# Patient Record
Sex: FEMALE | Race: WHITE | NOT HISPANIC OR LATINO | Employment: FULL TIME | URBAN - METROPOLITAN AREA
[De-identification: names, ages, dates, MRNs, and addresses within clinical notes are randomized per-mention and may not be internally consistent; named-entity substitution may affect disease eponyms.]

---

## 2017-08-30 ENCOUNTER — APPOINTMENT (OUTPATIENT)
Dept: GENERAL RADIOLOGY | Facility: HOSPITAL | Age: 32
End: 2017-08-30

## 2017-08-30 ENCOUNTER — HOSPITAL ENCOUNTER (INPATIENT)
Facility: HOSPITAL | Age: 32
LOS: 3 days | Discharge: HOME | End: 2017-09-02
Attending: EMERGENCY MEDICINE | Admitting: HOSPITALIST

## 2017-08-30 DIAGNOSIS — R26.89 IMPAIRED GAIT AND MOBILITY: ICD-10-CM

## 2017-08-30 DIAGNOSIS — M54.9 INTRACTABLE BACK PAIN: ICD-10-CM

## 2017-08-30 DIAGNOSIS — M54.42 ACUTE MIDLINE LOW BACK PAIN WITH LEFT-SIDED SCIATICA: Primary | ICD-10-CM

## 2017-08-30 DIAGNOSIS — R52 PAIN: ICD-10-CM

## 2017-08-30 LAB
ALBUMIN SERPL-MCNC: 4.9 G/DL (ref 3.5–5.2)
ALBUMIN/GLOB SERPL: 1.7 G/DL
ALP SERPL-CCNC: 57 U/L (ref 39–117)
ALT SERPL W P-5'-P-CCNC: 20 U/L (ref 1–33)
ANION GAP SERPL CALCULATED.3IONS-SCNC: 13 MMOL/L
AST SERPL-CCNC: 20 U/L (ref 1–32)
BASOPHILS # BLD AUTO: 0.02 10*3/MM3 (ref 0–0.2)
BASOPHILS NFR BLD AUTO: 0.4 % (ref 0–1.5)
BILIRUB SERPL-MCNC: 0.3 MG/DL (ref 0.1–1.2)
BUN BLD-MCNC: 8 MG/DL (ref 6–20)
BUN/CREAT SERPL: 10.4 (ref 7–25)
CALCIUM SPEC-SCNC: 9.7 MG/DL (ref 8.6–10.5)
CHLORIDE SERPL-SCNC: 104 MMOL/L (ref 98–107)
CO2 SERPL-SCNC: 25 MMOL/L (ref 22–29)
CREAT BLD-MCNC: 0.77 MG/DL (ref 0.57–1)
DEPRECATED RDW RBC AUTO: 38.5 FL (ref 37–54)
EOSINOPHIL # BLD AUTO: 0.06 10*3/MM3 (ref 0–0.7)
EOSINOPHIL NFR BLD AUTO: 1.3 % (ref 0.3–6.2)
ERYTHROCYTE [DISTWIDTH] IN BLOOD BY AUTOMATED COUNT: 12.8 % (ref 11.7–13)
GFR SERPL CREATININE-BSD FRML MDRD: 86 ML/MIN/1.73
GLOBULIN UR ELPH-MCNC: 2.9 GM/DL
GLUCOSE BLD-MCNC: 89 MG/DL (ref 65–99)
HCT VFR BLD AUTO: 38.3 % (ref 35.6–45.5)
HGB BLD-MCNC: 12.6 G/DL (ref 11.9–15.5)
IMM GRANULOCYTES # BLD: 0 10*3/MM3 (ref 0–0.03)
IMM GRANULOCYTES NFR BLD: 0 % (ref 0–0.5)
LYMPHOCYTES # BLD AUTO: 1.81 10*3/MM3 (ref 0.9–4.8)
LYMPHOCYTES NFR BLD AUTO: 37.7 % (ref 19.6–45.3)
MCH RBC QN AUTO: 27.4 PG (ref 26.9–32)
MCHC RBC AUTO-ENTMCNC: 32.9 G/DL (ref 32.4–36.3)
MCV RBC AUTO: 83.3 FL (ref 80.5–98.2)
MONOCYTES # BLD AUTO: 0.26 10*3/MM3 (ref 0.2–1.2)
MONOCYTES NFR BLD AUTO: 5.4 % (ref 5–12)
NEUTROPHILS # BLD AUTO: 2.65 10*3/MM3 (ref 1.9–8.1)
NEUTROPHILS NFR BLD AUTO: 55.2 % (ref 42.7–76)
PLATELET # BLD AUTO: 286 10*3/MM3 (ref 140–500)
PMV BLD AUTO: 10.5 FL (ref 6–12)
POTASSIUM BLD-SCNC: 3.6 MMOL/L (ref 3.5–5.2)
PROT SERPL-MCNC: 7.8 G/DL (ref 6–8.5)
RBC # BLD AUTO: 4.6 10*6/MM3 (ref 3.9–5.2)
SODIUM BLD-SCNC: 142 MMOL/L (ref 136–145)
WBC NRBC COR # BLD: 4.8 10*3/MM3 (ref 4.5–10.7)

## 2017-08-30 PROCEDURE — 25010000002 KETOROLAC TROMETHAMINE PER 15 MG: Performed by: HOSPITALIST

## 2017-08-30 PROCEDURE — 25010000002 PROMETHAZINE PER 50 MG: Performed by: EMERGENCY MEDICINE

## 2017-08-30 PROCEDURE — 96374 THER/PROPH/DIAG INJ IV PUSH: CPT

## 2017-08-30 PROCEDURE — 96375 TX/PRO/DX INJ NEW DRUG ADDON: CPT

## 2017-08-30 PROCEDURE — 25010000002 METHYLPREDNISOLONE PER 125 MG: Performed by: HOSPITALIST

## 2017-08-30 PROCEDURE — 80053 COMPREHEN METABOLIC PANEL: CPT | Performed by: EMERGENCY MEDICINE

## 2017-08-30 PROCEDURE — 85025 COMPLETE CBC W/AUTO DIFF WBC: CPT | Performed by: EMERGENCY MEDICINE

## 2017-08-30 PROCEDURE — 96372 THER/PROPH/DIAG INJ SC/IM: CPT

## 2017-08-30 PROCEDURE — G0378 HOSPITAL OBSERVATION PER HR: HCPCS

## 2017-08-30 PROCEDURE — 99284 EMERGENCY DEPT VISIT MOD MDM: CPT

## 2017-08-30 PROCEDURE — 25010000002 METHYLPREDNISOLONE PER 125 MG: Performed by: EMERGENCY MEDICINE

## 2017-08-30 PROCEDURE — 72110 X-RAY EXAM L-2 SPINE 4/>VWS: CPT

## 2017-08-30 PROCEDURE — 25010000002 HYDROMORPHONE PER 4 MG: Performed by: EMERGENCY MEDICINE

## 2017-08-30 RX ORDER — HYDROMORPHONE HYDROCHLORIDE 1 MG/ML
0.5 INJECTION, SOLUTION INTRAMUSCULAR; INTRAVENOUS; SUBCUTANEOUS ONCE
Status: COMPLETED | OUTPATIENT
Start: 2017-08-30 | End: 2017-08-30

## 2017-08-30 RX ORDER — OXYCODONE AND ACETAMINOPHEN 7.5; 325 MG/1; MG/1
1 TABLET ORAL 2 TIMES DAILY PRN
COMMUNITY

## 2017-08-30 RX ORDER — PROMETHAZINE HYDROCHLORIDE 25 MG/ML
25 INJECTION, SOLUTION INTRAMUSCULAR; INTRAVENOUS ONCE
Status: COMPLETED | OUTPATIENT
Start: 2017-08-30 | End: 2017-08-30

## 2017-08-30 RX ORDER — KETOROLAC TROMETHAMINE 30 MG/ML
30 INJECTION, SOLUTION INTRAMUSCULAR; INTRAVENOUS EVERY 6 HOURS
Status: DISCONTINUED | OUTPATIENT
Start: 2017-08-30 | End: 2017-09-02 | Stop reason: HOSPADM

## 2017-08-30 RX ORDER — ACETAMINOPHEN 325 MG/1
650 TABLET ORAL EVERY 4 HOURS PRN
Status: DISCONTINUED | OUTPATIENT
Start: 2017-08-30 | End: 2017-09-02 | Stop reason: HOSPADM

## 2017-08-30 RX ORDER — BACLOFEN 10 MG/1
10 TABLET ORAL ONCE
Status: COMPLETED | OUTPATIENT
Start: 2017-08-30 | End: 2017-08-30

## 2017-08-30 RX ORDER — METHYLPREDNISOLONE SODIUM SUCCINATE 125 MG/2ML
80 INJECTION, POWDER, LYOPHILIZED, FOR SOLUTION INTRAMUSCULAR; INTRAVENOUS EVERY 8 HOURS
Status: DISCONTINUED | OUTPATIENT
Start: 2017-08-30 | End: 2017-09-02 | Stop reason: HOSPADM

## 2017-08-30 RX ORDER — NALOXONE HCL 0.4 MG/ML
0.4 VIAL (ML) INJECTION
Status: DISCONTINUED | OUTPATIENT
Start: 2017-08-30 | End: 2017-09-02 | Stop reason: HOSPADM

## 2017-08-30 RX ORDER — IBUPROFEN 200 MG
800 TABLET ORAL EVERY 6 HOURS PRN
COMMUNITY

## 2017-08-30 RX ORDER — CYCLOBENZAPRINE HCL 10 MG
10 TABLET ORAL 3 TIMES DAILY PRN
Status: DISCONTINUED | OUTPATIENT
Start: 2017-08-30 | End: 2017-09-02 | Stop reason: HOSPADM

## 2017-08-30 RX ORDER — CYCLOBENZAPRINE HCL 10 MG
5 TABLET ORAL 3 TIMES DAILY PRN
COMMUNITY
End: 2017-09-13

## 2017-08-30 RX ORDER — HYDROMORPHONE HYDROCHLORIDE 1 MG/ML
0.5 INJECTION, SOLUTION INTRAMUSCULAR; INTRAVENOUS; SUBCUTANEOUS
Status: DISCONTINUED | OUTPATIENT
Start: 2017-08-30 | End: 2017-09-02 | Stop reason: HOSPADM

## 2017-08-30 RX ORDER — OXYCODONE AND ACETAMINOPHEN 10; 325 MG/1; MG/1
1 TABLET ORAL EVERY 4 HOURS PRN
Status: DISCONTINUED | OUTPATIENT
Start: 2017-08-30 | End: 2017-09-02 | Stop reason: HOSPADM

## 2017-08-30 RX ORDER — SODIUM CHLORIDE 0.9 % (FLUSH) 0.9 %
10 SYRINGE (ML) INJECTION AS NEEDED
Status: DISCONTINUED | OUTPATIENT
Start: 2017-08-30 | End: 2017-09-02 | Stop reason: HOSPADM

## 2017-08-30 RX ORDER — SODIUM CHLORIDE 0.9 % (FLUSH) 0.9 %
1-10 SYRINGE (ML) INJECTION AS NEEDED
Status: DISCONTINUED | OUTPATIENT
Start: 2017-08-30 | End: 2017-09-02 | Stop reason: HOSPADM

## 2017-08-30 RX ORDER — METHYLPREDNISOLONE SODIUM SUCCINATE 125 MG/2ML
125 INJECTION, POWDER, LYOPHILIZED, FOR SOLUTION INTRAMUSCULAR; INTRAVENOUS ONCE
Status: COMPLETED | OUTPATIENT
Start: 2017-08-30 | End: 2017-08-30

## 2017-08-30 RX ORDER — ONDANSETRON 2 MG/ML
4 INJECTION INTRAMUSCULAR; INTRAVENOUS EVERY 6 HOURS PRN
Status: DISCONTINUED | OUTPATIENT
Start: 2017-08-30 | End: 2017-09-02 | Stop reason: HOSPADM

## 2017-08-30 RX ORDER — HYDROMORPHONE HCL 110MG/55ML
2 PATIENT CONTROLLED ANALGESIA SYRINGE INTRAVENOUS ONCE
Status: COMPLETED | OUTPATIENT
Start: 2017-08-30 | End: 2017-08-30

## 2017-08-30 RX ORDER — MULTIPLE VITAMINS W/ MINERALS TAB 9MG-400MCG
1 TAB ORAL DAILY
Status: DISCONTINUED | OUTPATIENT
Start: 2017-08-31 | End: 2017-09-02 | Stop reason: HOSPADM

## 2017-08-30 RX ADMIN — HYDROMORPHONE HYDROCHLORIDE 0.5 MG: 1 INJECTION, SOLUTION INTRAMUSCULAR; INTRAVENOUS; SUBCUTANEOUS at 17:13

## 2017-08-30 RX ADMIN — OXYCODONE HYDROCHLORIDE AND ACETAMINOPHEN 1 TABLET: 10; 325 TABLET ORAL at 20:37

## 2017-08-30 RX ADMIN — HYDROMORPHONE HYDROCHLORIDE 2 MG: 2 INJECTION, SOLUTION INTRAMUSCULAR; INTRAVENOUS; SUBCUTANEOUS at 14:07

## 2017-08-30 RX ADMIN — PROMETHAZINE HYDROCHLORIDE 25 MG: 25 INJECTION INTRAMUSCULAR; INTRAVENOUS at 14:06

## 2017-08-30 RX ADMIN — BACLOFEN 10 MG: 10 TABLET ORAL at 16:51

## 2017-08-30 RX ADMIN — KETOROLAC TROMETHAMINE 30 MG: 30 INJECTION, SOLUTION INTRAMUSCULAR at 20:37

## 2017-08-30 RX ADMIN — METHYLPREDNISOLONE SODIUM SUCCINATE 80 MG: 125 INJECTION, POWDER, FOR SOLUTION INTRAMUSCULAR; INTRAVENOUS at 23:11

## 2017-08-30 RX ADMIN — CYCLOBENZAPRINE HYDROCHLORIDE 10 MG: 10 TABLET, FILM COATED ORAL at 20:37

## 2017-08-30 RX ADMIN — METHYLPREDNISOLONE SODIUM SUCCINATE 125 MG: 125 INJECTION, POWDER, FOR SOLUTION INTRAMUSCULAR; INTRAVENOUS at 17:13

## 2017-08-31 ENCOUNTER — APPOINTMENT (OUTPATIENT)
Dept: MRI IMAGING | Facility: HOSPITAL | Age: 32
End: 2017-08-31
Attending: ORTHOPAEDIC SURGERY

## 2017-08-31 PROBLEM — Z72.0 TOBACCO ABUSE: Status: ACTIVE | Noted: 2017-08-31

## 2017-08-31 PROCEDURE — G0378 HOSPITAL OBSERVATION PER HR: HCPCS

## 2017-08-31 PROCEDURE — 25010000002 METHYLPREDNISOLONE PER 125 MG: Performed by: HOSPITALIST

## 2017-08-31 PROCEDURE — 25010000002 LORAZEPAM PER 2 MG: Performed by: HOSPITALIST

## 2017-08-31 PROCEDURE — 25010000002 HYDROMORPHONE PER 4 MG: Performed by: HOSPITALIST

## 2017-08-31 PROCEDURE — 25010000002 KETOROLAC TROMETHAMINE PER 15 MG: Performed by: HOSPITALIST

## 2017-08-31 PROCEDURE — 72148 MRI LUMBAR SPINE W/O DYE: CPT

## 2017-08-31 RX ORDER — LORAZEPAM 2 MG/ML
1 INJECTION INTRAMUSCULAR ONCE AS NEEDED
Status: COMPLETED | OUTPATIENT
Start: 2017-08-31 | End: 2017-08-31

## 2017-08-31 RX ADMIN — OXYCODONE HYDROCHLORIDE AND ACETAMINOPHEN 1 TABLET: 10; 325 TABLET ORAL at 23:22

## 2017-08-31 RX ADMIN — KETOROLAC TROMETHAMINE 30 MG: 30 INJECTION, SOLUTION INTRAMUSCULAR at 09:03

## 2017-08-31 RX ADMIN — HYDROMORPHONE HYDROCHLORIDE 0.5 MG: 1 INJECTION, SOLUTION INTRAMUSCULAR; INTRAVENOUS; SUBCUTANEOUS at 00:22

## 2017-08-31 RX ADMIN — CYCLOBENZAPRINE HYDROCHLORIDE 10 MG: 10 TABLET, FILM COATED ORAL at 05:49

## 2017-08-31 RX ADMIN — OXYCODONE HYDROCHLORIDE AND ACETAMINOPHEN 1 TABLET: 10; 325 TABLET ORAL at 19:31

## 2017-08-31 RX ADMIN — CYCLOBENZAPRINE HYDROCHLORIDE 10 MG: 10 TABLET, FILM COATED ORAL at 13:49

## 2017-08-31 RX ADMIN — MULTIPLE VITAMINS W/ MINERALS TAB 1 TABLET: TAB at 09:03

## 2017-08-31 RX ADMIN — METHYLPREDNISOLONE SODIUM SUCCINATE 80 MG: 125 INJECTION, POWDER, FOR SOLUTION INTRAMUSCULAR; INTRAVENOUS at 05:49

## 2017-08-31 RX ADMIN — OXYCODONE HYDROCHLORIDE AND ACETAMINOPHEN 1 TABLET: 10; 325 TABLET ORAL at 03:43

## 2017-08-31 RX ADMIN — HYDROMORPHONE HYDROCHLORIDE 0.5 MG: 1 INJECTION, SOLUTION INTRAMUSCULAR; INTRAVENOUS; SUBCUTANEOUS at 05:49

## 2017-08-31 RX ADMIN — KETOROLAC TROMETHAMINE 30 MG: 30 INJECTION, SOLUTION INTRAMUSCULAR at 21:04

## 2017-08-31 RX ADMIN — KETOROLAC TROMETHAMINE 30 MG: 30 INJECTION, SOLUTION INTRAMUSCULAR at 15:22

## 2017-08-31 RX ADMIN — Medication 1 TABLET: at 09:04

## 2017-08-31 RX ADMIN — OXYCODONE HYDROCHLORIDE AND ACETAMINOPHEN 1 TABLET: 10; 325 TABLET ORAL at 08:48

## 2017-08-31 RX ADMIN — CYCLOBENZAPRINE HYDROCHLORIDE 10 MG: 10 TABLET, FILM COATED ORAL at 23:22

## 2017-08-31 RX ADMIN — HYDROMORPHONE HYDROCHLORIDE 0.5 MG: 1 INJECTION, SOLUTION INTRAMUSCULAR; INTRAVENOUS; SUBCUTANEOUS at 18:05

## 2017-08-31 RX ADMIN — OXYCODONE HYDROCHLORIDE AND ACETAMINOPHEN 1 TABLET: 10; 325 TABLET ORAL at 13:49

## 2017-08-31 RX ADMIN — METHYLPREDNISOLONE SODIUM SUCCINATE 80 MG: 125 INJECTION, POWDER, FOR SOLUTION INTRAMUSCULAR; INTRAVENOUS at 13:51

## 2017-08-31 RX ADMIN — HYDROMORPHONE HYDROCHLORIDE 0.5 MG: 1 INJECTION, SOLUTION INTRAMUSCULAR; INTRAVENOUS; SUBCUTANEOUS at 11:23

## 2017-08-31 RX ADMIN — KETOROLAC TROMETHAMINE 30 MG: 30 INJECTION, SOLUTION INTRAMUSCULAR at 03:43

## 2017-08-31 RX ADMIN — LORAZEPAM 1 MG: 2 INJECTION, SOLUTION INTRAMUSCULAR; INTRAVENOUS at 12:22

## 2017-08-31 RX ADMIN — METHYLPREDNISOLONE SODIUM SUCCINATE 80 MG: 125 INJECTION, POWDER, FOR SOLUTION INTRAMUSCULAR; INTRAVENOUS at 21:04

## 2017-09-01 ENCOUNTER — APPOINTMENT (OUTPATIENT)
Dept: GENERAL RADIOLOGY | Facility: HOSPITAL | Age: 32
End: 2017-09-01

## 2017-09-01 ENCOUNTER — ANESTHESIA EVENT (OUTPATIENT)
Dept: PAIN MEDICINE | Facility: HOSPITAL | Age: 32
End: 2017-09-01

## 2017-09-01 ENCOUNTER — ANESTHESIA (OUTPATIENT)
Dept: PAIN MEDICINE | Facility: HOSPITAL | Age: 32
End: 2017-09-01

## 2017-09-01 ENCOUNTER — APPOINTMENT (OUTPATIENT)
Dept: PAIN MEDICINE | Facility: HOSPITAL | Age: 32
End: 2017-09-01

## 2017-09-01 LAB — GLUCOSE BLDC GLUCOMTR-MCNC: 223 MG/DL (ref 70–130)

## 2017-09-01 PROCEDURE — 25010000002 METHYLPREDNISOLONE PER 125 MG: Performed by: HOSPITALIST

## 2017-09-01 PROCEDURE — C1755 CATHETER, INTRASPINAL: HCPCS

## 2017-09-01 PROCEDURE — 25010000002 KETOROLAC TROMETHAMINE PER 15 MG: Performed by: HOSPITALIST

## 2017-09-01 PROCEDURE — 3E0R33Z INTRODUCTION OF ANTI-INFLAMMATORY INTO SPINAL CANAL, PERCUTANEOUS APPROACH: ICD-10-PCS | Performed by: HOSPITALIST

## 2017-09-01 PROCEDURE — 97162 PT EVAL MOD COMPLEX 30 MIN: CPT

## 2017-09-01 PROCEDURE — 77003 FLUOROGUIDE FOR SPINE INJECT: CPT

## 2017-09-01 PROCEDURE — 25010000002 HYDROMORPHONE PER 4 MG: Performed by: HOSPITALIST

## 2017-09-01 PROCEDURE — 82962 GLUCOSE BLOOD TEST: CPT

## 2017-09-01 PROCEDURE — 25010000002 METHYLPREDNISOLONE PER 80 MG: Performed by: ANESTHESIOLOGY

## 2017-09-01 RX ORDER — OXYCODONE HYDROCHLORIDE AND ACETAMINOPHEN 5; 325 MG/1; MG/1
2 TABLET ORAL ONCE
Status: COMPLETED | OUTPATIENT
Start: 2017-09-01 | End: 2017-09-01

## 2017-09-01 RX ORDER — METHYLPREDNISOLONE ACETATE 80 MG/ML
80 INJECTION, SUSPENSION INTRA-ARTICULAR; INTRALESIONAL; INTRAMUSCULAR; SOFT TISSUE ONCE
Status: COMPLETED | OUTPATIENT
Start: 2017-09-01 | End: 2017-09-01

## 2017-09-01 RX ORDER — LIDOCAINE HYDROCHLORIDE 10 MG/ML
1 INJECTION, SOLUTION INFILTRATION; PERINEURAL ONCE AS NEEDED
Status: DISCONTINUED | OUTPATIENT
Start: 2017-09-01 | End: 2017-09-02 | Stop reason: HOSPADM

## 2017-09-01 RX ADMIN — OXYCODONE HYDROCHLORIDE AND ACETAMINOPHEN 2 TABLET: 5; 325 TABLET ORAL at 14:11

## 2017-09-01 RX ADMIN — METHYLPREDNISOLONE ACETATE 80 MG: 80 INJECTION, SUSPENSION INTRA-ARTICULAR; INTRALESIONAL; INTRAMUSCULAR; SOFT TISSUE at 13:24

## 2017-09-01 RX ADMIN — KETOROLAC TROMETHAMINE 30 MG: 30 INJECTION, SOLUTION INTRAMUSCULAR at 08:53

## 2017-09-01 RX ADMIN — KETOROLAC TROMETHAMINE 30 MG: 30 INJECTION, SOLUTION INTRAMUSCULAR at 20:28

## 2017-09-01 RX ADMIN — HYDROMORPHONE HYDROCHLORIDE 0.5 MG: 1 INJECTION, SOLUTION INTRAMUSCULAR; INTRAVENOUS; SUBCUTANEOUS at 09:24

## 2017-09-01 RX ADMIN — OXYCODONE HYDROCHLORIDE AND ACETAMINOPHEN 1 TABLET: 10; 325 TABLET ORAL at 23:00

## 2017-09-01 RX ADMIN — METHYLPREDNISOLONE SODIUM SUCCINATE 80 MG: 125 INJECTION, POWDER, FOR SOLUTION INTRAMUSCULAR; INTRAVENOUS at 05:04

## 2017-09-01 RX ADMIN — METHYLPREDNISOLONE SODIUM SUCCINATE 80 MG: 125 INJECTION, POWDER, FOR SOLUTION INTRAMUSCULAR; INTRAVENOUS at 20:28

## 2017-09-01 RX ADMIN — MULTIPLE VITAMINS W/ MINERALS TAB 1 TABLET: TAB at 08:53

## 2017-09-01 RX ADMIN — CYCLOBENZAPRINE HYDROCHLORIDE 10 MG: 10 TABLET, FILM COATED ORAL at 21:37

## 2017-09-01 RX ADMIN — CYCLOBENZAPRINE HYDROCHLORIDE 10 MG: 10 TABLET, FILM COATED ORAL at 08:53

## 2017-09-01 RX ADMIN — HYDROMORPHONE HYDROCHLORIDE 0.5 MG: 1 INJECTION, SOLUTION INTRAMUSCULAR; INTRAVENOUS; SUBCUTANEOUS at 01:18

## 2017-09-01 RX ADMIN — Medication 1 TABLET: at 08:53

## 2017-09-01 RX ADMIN — OXYCODONE HYDROCHLORIDE AND ACETAMINOPHEN 1 TABLET: 10; 325 TABLET ORAL at 05:05

## 2017-09-01 RX ADMIN — KETOROLAC TROMETHAMINE 30 MG: 30 INJECTION, SOLUTION INTRAMUSCULAR at 03:34

## 2017-09-01 NOTE — PROGRESS NOTES
"DAILY PROGRESS NOTE  [facility]    Patient Identification:  Name: Pretty Israel  Age: 32 y.o.  Sex: female  :  1985  MRN: [id number]         Primary Care Physician: Jonh Thornton MD    Subjective:  Interval History:She complains of back pain and leg pain.   and leg pain    Objective:    Scheduled Meds:    folic acid-vit B6-vit B12 1 tablet Oral Daily   ketorolac 30 mg Intravenous Q6H   methylPREDNISolone sodium succinate 80 mg Intravenous Q8H   multivitamin with minerals 1 tablet Oral Daily     Continuous Infusions:     Vital signs in last 24 hours:  Temp:  [97 °F (36.1 °C)-97.9 °F (36.6 °C)] 97.9 °F (36.6 °C)  Heart Rate:  [73-91] 73  Resp:  [18-20] 18  BP: ()/(55-73) 95/55    Intake/Output:    Intake/Output Summary (Last 24 hours) at 17 0950  Last data filed at 17 0900   Gross per 24 hour   Intake              880 ml   Output             1200 ml   Net             -320 ml       Exam:  BP 95/55 (BP Location: Right arm, Patient Position: Lying)  Pulse 73  Temp 97.9 °F (36.6 °C) (Oral)   Resp 18  Ht 66\" (167.6 cm)  Wt 226 lb (103 kg)  SpO2 98%  BMI 36.48 kg/m2    General Appearance:    Alert, cooperative, no distress   Head:    Normocephalic, without obvious abnormality, atraumatic   Eyes:       Throat:   Lips, tongue, gums normal   Neck:   Supple, symmetrical, trachea midline, no JVD   Lungs:     Clear to auscultation bilaterally, respirations unlabored   Chest Wall:    No tenderness or deformity    Heart:    Regular rate and rhythm, S1 and S2 normal, no murmur,no  Rub or gallop   Abdomen:     Soft, non-tender, bowel sounds active, no masses, no organomegaly    Extremities:   Extremities normal, atraumatic, no cyanosis or edema   Pulses:      Skin:   Skin is warm and dry,  no rashes or palpable lesions   Neurologic:   no focal deficits noted      [unfilled]  Data Review:    Results from last 7 days  Lab Units 17  1718   SODIUM mmol/L 142   POTASSIUM " mmol/L 3.6   CHLORIDE mmol/L 104   CO2 mmol/L 25.0   BUN mg/dL 8   CREATININE mg/dL 0.77   GLUCOSE mg/dL 89   CALCIUM mg/dL 9.7       Results from last 7 days  Lab Units 08/30/17  1718   WBC 10*3/mm3 4.80   HEMOGLOBIN g/dL 12.6   HEMATOCRIT % 38.3   PLATELETS 10*3/mm3 286             No results found for: TROPONINT        Results from last 7 days  Lab Units 08/30/17  1718   ALK PHOS U/L 57   BILIRUBIN mg/dL 0.3   ALT (SGPT) U/L 20   AST (SGOT) U/L 20             No results found for: POCGLU        Patient Active Problem List   Diagnosis Code   • Acute midline low back pain with left-sided sciatica M54.42   • Tobacco abuse Z72.0       Assessment:  Principal Problem:    Acute midline low back pain with left-sided sciatica  Active Problems:    Tobacco abuse      Plan:  Await results of MRI and plans for surgery.    Torin Jacques MD  9/1/2017  9:50 AM

## 2017-09-01 NOTE — PLAN OF CARE
Problem: Patient Care Overview (Adult)  Goal: Plan of Care Review  Outcome: Ongoing (interventions implemented as appropriate)    09/01/17 0532   Coping/Psychosocial Response Interventions   Plan Of Care Reviewed With patient   Patient Care Overview   Progress no change   Outcome Evaluation   Outcome Summary/Follow up Plan med for pain, steroids given, wanted to use bsc       Goal: Adult Individualization and Mutuality  Outcome: Ongoing (interventions implemented as appropriate)  Goal: Discharge Needs Assessment  Outcome: Ongoing (interventions implemented as appropriate)    Problem: Pain, Acute (Adult)  Goal: Identify Related Risk Factors and Signs and Symptoms  Outcome: Ongoing (interventions implemented as appropriate)  Goal: Acceptable Pain Control/Comfort Level  Outcome: Ongoing (interventions implemented as appropriate)

## 2017-09-01 NOTE — PLAN OF CARE
Problem: Patient Care Overview (Adult)  Goal: Plan of Care Review    09/01/17 1155   Coping/Psychosocial Response Interventions   Plan Of Care Reviewed With patient   Outcome Evaluation   Outcome Summary/Follow up Plan pt presents w, generalized weakness and pain LLE - significant, moans with mobility, describes and burning and mostly localized L buttocks. fair tolerance to household ambulation w. assist of rwx,. educated on therex and importance of mobility. may benefit from skilled PT acutely to address functional deficits, would recommend OP PT referral prior to surgery at [place].          Problem: Inpatient Physical Therapy  Goal: Bed Mobility Goal LTG- PT    09/01/17 1155   Bed Mobility PT LTG   Bed Mobility PT LTG, Date Established 09/01/17   Bed Mobility PT LTG, Time to Achieve 1 wk   Bed Mobility PT LTG, Activity Type all bed mobility   Bed Mobility PT LTG, [place] Level conditional independence       Goal: Transfer Training Goal 1 LTG- PT    09/01/17 1155   Transfer Training PT LTG   Transfer Training PT LTG, Date Established 09/01/17   Transfer Training PT LTG, Time to Achieve 1 wk   Transfer Training PT LTG, Activity Type all transfers   Transfer Training PT LTG, [place] Level conditional independence   Transfer Training PT LTG, Assist Device walker, rolling       Goal: Gait Training Goal LTG- PT    09/01/17 1155   Gait Training PT LTG   Gait Training Goal PT LTG, Date Established 09/01/17   Gait Training Goal PT LTG, Time to Achieve 1 wk   Gait Training Goal PT LTG, [place] Level conditional independence   Gait Training Goal PT LTG, Assist Device walker, rolling   Gait Training Goal PT LTG, Distance to Achieve 400

## 2017-09-01 NOTE — ANESTHESIA PROCEDURE NOTES
PAIN Epidural block    Patient location during procedure: pain clinic  Indication:procedure for pain  Performed By  Anesthesiologist: BARTOLOME MARKHAM  Preanesthetic Checklist  Completed: patient identified, site marked, surgical consent, pre-op evaluation, timeout performed, IV checked, risks and benefits discussed and monitors and equipment checked  Additional Notes  LSS/LRAD  Prep:  Pt Position:prone  Sterile Tech:cap, gloves, mask and sterile barrier  Prep:chlorhexidine gluconate and isopropyl alcohol  Monitoring:blood pressure monitoring, continuous pulse oximetry and EKG  Procedure:  Approach:left paramedian  Guidance: fluoroscopy  Location:lumbar  Level:5-6  Needle Type:Tuohy  Needle Gauge:20  Aspiration:negative  Medications:  Depomedrol:80  Preservative Free Saline:3mL    Post Assessment:  Dressing:secured with tape  Pt Tolerance:patient tolerated the procedure well with no apparent complications  Complications:no

## 2017-09-01 NOTE — PROGRESS NOTES
Spine  The patient is feeling better but continues to have back and left leg pain.  She was able to sit at the edge of the bed and shuffle  to the potty.    Physical exam: She has some trace weakness in the left lower extremity mostly due to pain.  Her neurologic status is stable.    MRI: I reviewed the scan which continues to show spinal stenosis and a isthmic spondylolisthesis at L5-S1.  There are some minor disc bulges above.  There is no new finding including no evidence of disc herniation or fracture.    Impression:  1.  L5-S1 isthmic spondylolisthesis with spinal stenosis with intractable back and left leg pain after fall.    Plan: I have ordered a physical therapy consult and a pain clinic consult for possible epidural steroid injection.  The only concern is that she has been receiving Toradol which may preclude her from the epidural.  I have no plans for any urgent or emergent surgery under the current conditions.  I would prefer that she would stabilize before scheduling her surgery on elective basis.

## 2017-09-01 NOTE — H&P
"[facility]    History and Physical    Patient Name: Pretty Israel  :  1985  MRN:  [id number]  Date of Admission: 2017    Subjective     Patient is a 32 y.o. female presents with chief complaint of chronic low back pain.  Onset of symptoms was gradual starting 3 years ago.  Symptoms are associated/aggravated by activity. Symptoms improve with injection.  Patient has been receiving epidurals at Dr. Chambers with improvement.   patient fell at home, hurt her back, and was admitted to hospital for pain. MRI showed L5S1 LSS.  Symptoms of pain radiate down left leg with weakness to the foot. 10/10.    The following portions of the patients history were reviewed and updated as appropriate: current medications, allergies, past medical history, past surgical history, past family history, past social history and problem list                Objective     Past Medical History:   Past Medical History:   Diagnosis Date   • Injury of back      Past Surgical History: History reviewed. No pertinent surgical history.  Family History: History reviewed. No pertinent family history.  Social History:   Social History   Substance Use Topics   • Smoking status: Current Some Day Smoker     Packs/day: 0.50     Types: Cigarettes   • Smokeless tobacco: None      Comment: only smokes when drinking   • Alcohol use Yes      Comment: every other week       Vital Signs Range for the last 24 hours  Temperature: Temp:  [36.1 °C (97 °F)-36.6 °C (97.9 °F)] 36.3 °C (97.3 °F)   Temp Source: Temp src: Oral   BP: BP: ()/(55-81) 133/81   Pulse: Heart Rate:  [73-91] 88   Respirations: Resp:  [16-18] 16   SPO2: SpO2:  [95 %-98 %] 96 %   O2 Amount (l/min):     O2 Devices O2 Device: room air   Weight:       Flowsheet Rows         First Filed Value    Admission Height  66\" (167.6 cm) Documented at 2017 1720    Admission Weight  220 lb (99.8 kg) Documented at 2017 1720    "       --------------------------------------------------------------------------------    Current Facility-Administered Medications   Medication Dose Route Frequency Provider Last Rate Last Dose   • acetaminophen (TYLENOL) tablet 650 mg  650 mg Oral Q4H PRN Christopher Moreland MD       • cyclobenzaprine (FLEXERIL) tablet 10 mg  10 mg Oral TID PRN Christopher Moreland MD   10 mg at 09/01/17 0853   • folic acid-vit B6-vit B12 (FOLGARD) tablet 1 tablet  1 tablet Oral Daily Christopher Moreland MD   1 tablet at 09/01/17 0853   • HYDROmorphone (DILAUDID) injection 0.5 mg  0.5 mg Intravenous Q3H PRN Christopher Moreland MD   0.5 mg at 09/01/17 0924    And   • naloxone (NARCAN) injection 0.4 mg  0.4 mg Intravenous Q5 Min PRN Christopher Moreland MD       • ketorolac (TORADOL) injection 30 mg  30 mg Intravenous Q6H Christopher Moreland MD   30 mg at 09/01/17 0853   • lidocaine (XYLOCAINE) 1 % injection 1 mL  1 mL Intradermal Once PRN Lo Hartmann MD       • methylPREDNISolone acetate (DEPO-medrol) injection 80 mg  80 mg Intra-articular Once Lo Hartmann MD       • methylPREDNISolone sodium succinate (SOLU-Medrol) injection 80 mg  80 mg Intravenous Q8H Christopher Moreland MD   80 mg at 09/01/17 0504   • multivitamin with minerals 1 tablet  1 tablet Oral Daily Christopher Moreland MD   1 tablet at 09/01/17 0853   • ondansetron (ZOFRAN) injection 4 mg  4 mg Intravenous Q6H PRN Christopher Moreland MD       • oxyCODONE-acetaminophen (PERCOCET)  MG per tablet 1 tablet  1 tablet Oral Q4H PRN Christopher Moreland MD   1 tablet at 09/01/17 0505   • sodium chloride 0.9 % flush 1-10 mL  1-10 mL Intravenous PRHAYDEE Moreland MD       • sodium chloride 0.9 % flush 10 mL  10 mL Intravenous PRHAYDEE Watson MD           --------------------------------------------------------------------------------  Assessment/Plan      Anesthesia Evaluation     Patient summary  reviewed and Nursing notes reviewed          Airway   Mallampati: II  TM distance: >3 FB  Neck ROM: full  no difficulty expected  Dental - normal exam     Pulmonary     breath sounds clear to auscultation  (+) a smoker Current,   Cardiovascular - negative cardio ROS and normal exam  Exercise tolerance: good (4-7 METS)    Rhythm: regular  Rate: normal        Neuro/Psych- negative ROS  (+)  abnormal gait  GI/Hepatic/Renal/Endo    (+) morbid obesity,     Musculoskeletal (-) normal exam    (+) back pain, Straight Leg Test, BHARGAVI test  Abdominal  - normal exam   Substance History - negative use     OB/GYN          Other - negative ROS           Phys Exam Other: 4/5 strength on the left due to pain                         Diagnosis and Plan    Treatment Plan  ASA 2      Procedures: Lumbar Epidural Steroid Injection(LESI), With fluoroscopy,       Anesthetic plan and risks discussed with patient.          Diagnosis     * Lumbar radiculopathy [M54.16]     * Lumbar spinal stenosis [M48.06]

## 2017-09-01 NOTE — THERAPY EVALUATION
Acute Care - Physical Therapy Initial Evaluation  [facility]     Patient Name: Pretty Israel  : 1985  MRN: [id number]  Today's Date: 2017   Onset of Illness/Injury or Date of Surgery Date: 17  Date of Referral to PT: 17  Referring Physician: Nat      Admit Date: 2017     Visit Dx:    ICD-10-CM ICD-9-CM   1. Acute midline low back pain with left-sided sciatica M54.42 724.2     724.3   2. Intractable back pain M54.9 724.5   3. Impaired gait and mobility R26.89 781.2     Patient Active Problem List   Diagnosis   • Acute midline low back pain with left-sided sciatica   • Tobacco abuse     Past Medical History:   Diagnosis Date   • Injury of back      History reviewed. No pertinent surgical history.       PT ASSESSMENT (last 72 hours)      PT Evaluation       17 1151 17 1548    Rehab Evaluation    Document Type evaluation  -     Subjective Information agree to therapy;complains of;pain  -     Patient Effort, Rehab Treatment good  -     General Information    Patient Profile Review yes  -     Onset of Illness/Injury or Date of Surgery Date 17  -     Referring Physician Nat  -     General Observations supine in bed no acute distress  -     Pertinent History Of Current Problem LLE pain, stenosis/spondy L5-s1 s/p recent fall  -     Precautions/Limitations fall precautions;spinal precautions  -     Prior Level of Function independent:  -     Equipment Currently Used at Home none  - none  -    Plans/Goals Discussed With patient  -     Risks Reviewed patient:  -     Benefits Reviewed patient:  -     Living Environment    Lives With  significant other;child(osmani), dependent  -    Living Arrangements  house  -    Home Accessibility  stairs within home  -    Type of Financial/Environmental Concern  none  -    Transportation Available  car;family or friend will provide  -    Clinical Impression    Date of Referral to PT 17  [facility]      Criteria for Skilled Therapeutic Interventions Met yes  -     Pathology/Pathophysiology Noted (Describe Specifically for Each System) musculoskeletal;neuromuscular  -     Impairments Found (describe specific impairments) gait, locomotion, and balance;joint integrity and mobility  -     Functional Limitations in Following Categories (Describe Specific Limitations) self-care;home management  -     Rehab Potential good, to achieve stated therapy goals  -     Pain Assessment    Pain Assessment 0-10  -     Pain Score 8  -     Post Pain Score 8  -     Pain Type Acute pain  -     Pain Location Back  -     Pain Descriptors Burning  -     Pain Intervention(s) Repositioned;Ambulation/increased activity  -     Response to Interventions napoleon  -     Vision Assessment/Intervention    Visual Impairment WNL  -     Cognitive Assessment/Intervention    Current Cognitive/Communication Assessment functional  -     Orientation Status oriented x 4  -     ROM (Range of Motion)    General ROM no range of motion deficits identified  -     General ROM Detail guarding LLE 2' pain  -     MMT (Manual Muscle Testing)    General MMT Assessment no strength deficits identified  -     General MMT Assessment Detail BLEs grossly at least 3/5  -     Bed Mobility, Assessment/Treatment    Bed Mob, Supine to Sit, [place] contact guard assist  -     Bed Mob, Sit to Supine, [place] not tested  -     Bed Mobility, Comment log rolling  -     Transfer Assessment/Treatment    Transfers, Sit-Stand [place] contact guard assist  -     Transfers, Stand-Sit [place] contact guard assist  -     Transfers, Sit-Stand-Sit, Assist Device rolling walker  -     Transfer, Impairments pain  -     Gait Assessment/Treatment    Gait, [place] Level contact guard assist;1 person + 1 person to manage equipment  -     Gait, Assistive Device rolling walker  -     Gait, Distance (Feet) 55  -      "Gait, Safety Issues step length decreased;weight-shifting ability decreased  -     Gait, Impairments pain  -     Therapy Exercises    Left Lower Extremity AROM:;5 reps;sitting;LAQ   \"nerve flossing\" and standing extension  -     Positioning and Restraints    Pre-Treatment Position in bed  -LH     Post Treatment Position chair  -     In Chair sitting;call light within reach;encouraged to call for assist;notified ns  -       08/30/17 1939 08/30/17 1931    General Information    Equipment Currently Used at Home  other (see comments)   back brace  -MM    Living Environment    Lives With child(osmani), dependent;significant other  -MM     Living Arrangements house  -     Home Accessibility other (see comments)   top stairs yes rails bottom no rails  -     Transportation Available car  -       User Key  (r) = Recorded By, (t) = Taken By, (c) = Cosigned By    Initials Name Provider Type    MM Sofia Ahumada, RN Registered Nurse     Little Cheung, PT Physical Therapist    ONEYDA Claudio, LCSW           Physical Therapy Education     Title: PT OT SLP Therapies (Done)     Topic: Physical Therapy (Done)     Point: Mobility training (Done)    Learning Progress Summary    Learner Readiness Method Response Comment Documented by Status   Patient Acceptance E ANASTASIYA,[facility] 09/01/17 1155 Done               Point: Home exercise program (Done)    Learning Progress Summary    Learner Readiness Method Response Comment Documented by Status   [facility]17 1155 Done               Point: Body mechanics (Done)    Learning Progress Summary    Learner Readiness Method Response Comment Documented by Status   [facility] 09/01/17 1155 Done               Point: Precautions (Done)    Learning Progress Summary    Learner Readiness Method Response Comment Documented by Status   [facility] 09/01/17 1155 Done                      User Key     Initials Effective " Dates Name Provider Type Discipline     02/07/17 -  Little Cheung, PT Physical Therapist PT                PT Recommendation and Plan  Anticipated Discharge Disposition: home with outpatient services  Planned Therapy Interventions: bed mobility training, balance training, gait training, home exercise program, ROM (Range of Motion), stair training, strengthening, stretching, transfer training  PT Frequency: daily  Plan of Care Review  Plan Of Care Reviewed With: patient  Outcome Summary/Follow up Plan: pt presents w, generalized weakness and pain LLE - significant, moans with mobility, describes and burning and mostly localized L buttocks. fair  tolerance to household ambulation w. assist of rwx,. educated on therex and importance of mobility. may benefit from skilled PT acutely to address functional deficits, would recommend OP PT referral prior to surgery at [place].           IP PT Goals       09/01/17 1155          Bed Mobility PT LTG    Bed Mobility PT LTG, Date Established 09/01/17  -      Bed Mobility PT LTG, Time to Achieve 1 wk  -LH      Bed Mobility PT LTG, Activity Type all bed mobility  -      Bed Mobility PT LTG, [place] Level conditional independence  -LH      Transfer Training PT LTG    Transfer Training PT LTG, Date Established 09/01/17  -      Transfer Training PT LTG, Time to Achieve 1 wk  -LH      Transfer Training PT LTG, Activity Type all transfers  -      Transfer Training PT LTG, [place] Level conditional independence  -LH      Transfer Training PT LTG, Assist Device walker, rolling  -LH      Gait Training PT LTG    Gait Training Goal PT LTG, Date Established 09/01/17  -      Gait Training Goal PT LTG, Time to Achieve 1 wk  -LH      Gait Training Goal PT LTG, [place] Level conditional independence  -LH      Gait Training Goal PT LTG, Assist Device walker, rolling  -LH      Gait Training Goal PT LTG, Distance to Achieve 400  -LH        User Key  (r) = Recorded By, (t) =  Taken By, (c) = Cosigned By    Initials Name Provider Type     Little Cheung PT Physical Therapist                Outcome Measures       09/01/17 1100          How much help from another person do you currently need...    Turning from your back to your side while in flat bed without using bedrails? 3  -LH      Moving from lying on back to sitting on the side of a flat bed without bedrails? 3  -LH      Moving to and from a bed to a chair (including a wheelchair)? 3  -LH      Standing up from a chair using your arms (e.g., wheelchair, bedside chair)? 3  -LH      Climbing 3-5 steps with a railing? 3  -LH      To walk in hospital room? 3  -      AM-PAC 6 Clicks Score 18  -      Functional Assessment    Outcome Measure Options AM-PAC 6 Clicks Basic Mobility (PT)  -        User Key  (r) = Recorded By, (t) = Taken By, (c) = Cosigned By    Initials Name Provider Type     Little Cheung PT Physical Therapist           Time Calculation:         PT Charges       09/01/17 1158          Time Calculation    Start Time 1129  -      Stop Time 1150  -      Time Calculation (min) 21 min  -      PT Received On 09/01/17  -      PT - Next Appointment 09/02/17  -      PT Goal Re-Cert Due Date 09/08/17  -        User Key  (r) = Recorded By, (t) = Taken By, (c) = Cosigned By    Initials Name Provider Type     Little Cheung PT Physical Therapist          Therapy Charges for Today     Code Description Service Date Service Provider Modifiers Qty    83864838707 HC PT THER SUPP EA 15 MIN 9/1/2017 Little Cheung, PT GP 1    72040242598 HC PT EVAL MOD COMPLEXITY 2 9/1/2017 Little Cheung, PT GP 1          PT G-Codes  Outcome Measure Options: AM-PAC 6 Clicks Basic Mobility (PT)      Little Cheung PT  9/1/2017

## 2017-09-02 VITALS
DIASTOLIC BLOOD PRESSURE: 73 MMHG | OXYGEN SATURATION: 97 % | WEIGHT: 226 LBS | HEART RATE: 73 BPM | BODY MASS INDEX: 36.32 KG/M2 | TEMPERATURE: 98.1 F | SYSTOLIC BLOOD PRESSURE: 127 MMHG | RESPIRATION RATE: 20 BRPM | HEIGHT: 66 IN

## 2017-09-02 PROCEDURE — 25010000002 KETOROLAC TROMETHAMINE PER 15 MG: Performed by: HOSPITALIST

## 2017-09-02 PROCEDURE — 25010000002 METHYLPREDNISOLONE PER 125 MG: Performed by: HOSPITALIST

## 2017-09-02 RX ORDER — CYCLOBENZAPRINE HCL 10 MG
10 TABLET ORAL 3 TIMES DAILY PRN
Qty: 42 TABLET | Refills: 0 | Status: ON HOLD | OUTPATIENT
Start: 2017-09-02 | End: 2017-09-16

## 2017-09-02 RX ADMIN — CYCLOBENZAPRINE HYDROCHLORIDE 10 MG: 10 TABLET, FILM COATED ORAL at 12:03

## 2017-09-02 RX ADMIN — METHYLPREDNISOLONE SODIUM SUCCINATE 80 MG: 125 INJECTION, POWDER, FOR SOLUTION INTRAMUSCULAR; INTRAVENOUS at 06:03

## 2017-09-02 RX ADMIN — OXYCODONE HYDROCHLORIDE AND ACETAMINOPHEN 1 TABLET: 10; 325 TABLET ORAL at 10:18

## 2017-09-02 RX ADMIN — MULTIPLE VITAMINS W/ MINERALS TAB 1 TABLET: TAB at 08:40

## 2017-09-02 RX ADMIN — METHYLPREDNISOLONE SODIUM SUCCINATE 80 MG: 125 INJECTION, POWDER, FOR SOLUTION INTRAMUSCULAR; INTRAVENOUS at 12:08

## 2017-09-02 RX ADMIN — KETOROLAC TROMETHAMINE 30 MG: 30 INJECTION, SOLUTION INTRAMUSCULAR at 03:46

## 2017-09-02 RX ADMIN — Medication 1 TABLET: at 08:40

## 2017-09-02 RX ADMIN — OXYCODONE HYDROCHLORIDE AND ACETAMINOPHEN 1 TABLET: 10; 325 TABLET ORAL at 06:02

## 2017-09-02 RX ADMIN — KETOROLAC TROMETHAMINE 30 MG: 30 INJECTION, SOLUTION INTRAMUSCULAR at 08:40

## 2017-09-02 NOTE — PROGRESS NOTES
Spine  Pt much improved today after HUI.  Wants to go home.  Agree with discharge.  Follow-up in clinic with me to schedule surgery.

## 2017-09-02 NOTE — PLAN OF CARE
Problem: Patient Care Overview (Adult)  Goal: Plan of Care Review  Outcome: Ongoing (interventions implemented as appropriate)    09/02/17 0529   Coping/Psychosocial Response Interventions   Plan Of Care Reviewed With patient   Patient Care Overview   Progress progress toward functional goals as expected   Outcome Evaluation   Outcome Summary/Follow up Plan had epidural 09/01 with some relief, med for pain, did not walk       Goal: Adult Individualization and Mutuality  Outcome: Ongoing (interventions implemented as appropriate)  Goal: Discharge Needs Assessment  Outcome: Ongoing (interventions implemented as appropriate)    Problem: Pain, Acute (Adult)  Goal: Identify Related Risk Factors and Signs and Symptoms  Outcome: Ongoing (interventions implemented as appropriate)  Goal: Acceptable Pain Control/Comfort Level  Outcome: Ongoing (interventions implemented as appropriate)

## 2017-09-02 NOTE — DISCHARGE SUMMARY
PHYSICIAN DISCHARGE SUMMARY                                                                        [facility]    Patient Identification:  Name: Pretty Israel  Age: 32 y.o.  Sex: female  :  1985  MRN: [id number]  Primary Care Physician: Jonh Thornton MD    Admit date: 2017  Discharge date and time:2017  Discharged Condition: fair    Discharge Diagnoses:Principal Problem:    Acute midline low back pain with left-sided sciatica  Active Problems:    Tobacco abuse     Patient Active Problem List   Diagnosis Code   • Acute midline low back pain with left-sided sciatica M54.42   • Tobacco abuse Z72.0       PMHX:   Past Medical History:   Diagnosis Date   • Injury of back      PSHX: History reviewed. No pertinent surgical history.    Hospital Course: Pretty Israel  is a 32 y.o. female who presents to [facility] complaining of Back pain           She stated that she was just returning home from a ball game and was trying to get out of the truck when she sort of slid, or missed a step and fell, injuring her back.  She did not fall with the ground but sort of eased herself down with help.  After getting down, she could not raise herself back up to ambulate.  She had waited for her boyfriend to get home and even then it took nearly an hour to get in the house.  She laid down in the bed in the hopes that her pain would get better, but it persisted when she woke up this morning.  The pain is in the lower back and radiates down the left leg.  She has not been incontinent of urine.  She finally came into the emergency room for additional evaluation.  The patient has been seen long-term by pain management for this condition, receiving epidurals and Percocet.  She has also been seen by Dr. Johns and is actually scheduled to have lumbar surgery on .            The patient was admitted the hospital  and had MRI lumbar spine and was seen by orthopedic surgery spine.  She had epidural steroid injection and PT eval.  She was feeling better after being in the hospital for couple days and able to get up and walk a little.  She's going to go home and continue with pain medication and muscle relaxers and follow-up with orthopedic surgery.  They plan on trying to move updated elective surgery.  She'll follow-up with her primary care doctor.    Consults:     Consults     Date and Time Order Name Status Description    8/30/2017 2012 Inpatient Consult to Orthopedic Surgery      8/30/2017 1712 [facility] (on-call MD unless specified) Completed     8/30/2017 1620 Family Medicine Consult            Results from last 7 days  Lab Units 08/30/17  1718   WBC 10*3/mm3 4.80   HEMOGLOBIN g/dL 12.6   HEMATOCRIT % 38.3   PLATELETS 10*3/mm3 286       Results from last 7 days  Lab Units 08/30/17  1718   SODIUM mmol/L 142   POTASSIUM mmol/L 3.6   CHLORIDE mmol/L 104   CO2 mmol/L 25.0   BUN mg/dL 8   CREATININE mg/dL 0.77   GLUCOSE mg/dL 89   CALCIUM mg/dL 9.7     Significant Diagnostic Studies:   Lab Results   Component Value Date    WBC 4.80 08/30/2017    HGB 12.6 08/30/2017    HCT 38.3 08/30/2017     08/30/2017     Lab Results   Component Value Date     08/30/2017    K 3.6 08/30/2017     08/30/2017    CO2 25.0 08/30/2017    BUN 8 08/30/2017    CREATININE 0.77 08/30/2017    GLUCOSE 89 08/30/2017     Lab Results   Component Value Date    CALCIUM 9.7 08/30/2017     Lab Results   Component Value Date    AST 20 08/30/2017    ALT 20 08/30/2017    ALKPHOS 57 08/30/2017     No results found for: APTT, INR  No results found for: COLORU, CLARITYU, SPECGRAV, PHUR, PROTEINUR, GLUCOSEU, KETONESU, BLOODU, NITRITE, LEUKOCYTESUR, BILIRUBINUR, UROBILINOGEN, RBCUA, WBCUA, BACTERIA  No results found for: TROPONINT, TROPONINI, BNP  No components found for: HGBA1C;2  No components found for: TSH;2  Imaging Results (all)     Procedure Component  Value Units Date/Time    XR Spine Lumbar 4+ View [[id number]] Collected:  08/30/17 1455     Updated:  08/30/17 1501    Narrative:       LUMBAR SPINE IMAGING 5 VIEWS     HISTORY: 32-year-old female who fell complains of low back pain into  both hips and legs     COMPARISON: 07/30/2014     FINDINGS:  1. Stable negative acute lumbar spine series.. 12 mm of anterolisthesis  of L5 on S1 with disc space narrowing similar to previous a previous  examination.  2. No acute vertebral body compression deformity, traumatic malalignment  nor other change since previous study.  3. Bilateral L5 spondylolysis, similar to previous study.     This report was finalized on 8/30/2017 2:58 PM by Dr. Myron Mon MD.       MRI Lumbar Spine Without Contrast [[id number]] Collected:  08/31/17 1516     Updated:  08/31/17 1659    Narrative:       MRI OF THE LUMBAR SPINE WITHOUT CONTRAST     CLINICAL HISTORY: Patient fell downstairs on 08/29/2017. Patient is  unable to stand and has limited movement in both lower extremities.       MRI of the lumbar spine is obtained with sagittal T1, proton-density,  and T2-weighted images. Additionally, there are axial T1 and T2-weighted  images through the lumbar spine.     FINDINGS:     At T12-L1, L1-2, and L2-3, there is no significant canal or foraminal  stenosis.     At L3-4, there is mild disc bulging. There is an annular tear. There is  minimal canal and foraminal narrowing.     The L4-5 level is remarkable for mild facet arthropathy but no  significant canal or foraminal narrowing.     At L5, there are bilateral pars defects. There is anterior  spondylolisthesis of L5 on S1 by approximately 8 mm. There is  moderate-to-severe bilateral foraminal stenosis at L5-S1 secondary to  uncovered disc bulging.     There is no evidence to suggest an acute or subacute fracture within the  lumbar spine or the visualized sacrum.     The conus medullaris terminates at the L1-2 level and has normal signal  intensity.  The visualized distal thoracic spinal cord is unremarkable in  appearance.       Impression:          There are bilateral pars defects at L5 resulting in anterior  spondylolisthesis of L5 on S1 by approximately 8 mm. Uncovered disc  bulging results in moderate-to-severe bilateral foraminal stenosis at  L5-S1.     At L3-4, there is mild disc bulging with an annular tear.  However, only  minimal canal and foraminal narrowing is identified at this level.     This report was finalized on 8/31/2017 4:56 PM by Dr. Nj Avila MD.       [place] Guided Pain Management Spine [[id number]] Resulted:  09/01/17 1332     Updated:  09/01/17 1332    Narrative:       This procedure was auto-finalized with no dictation required.        Lab Results (last 7 days)     Procedure Component Value Units Date/Time    CBC & Differential [[id number]] Collected:  08/30/17 1718    Specimen:  Blood Updated:  08/30/17 1744    Narrative:       The following orders were created for panel order CBC & Differential.  Procedure                               Abnormality         Status                     ---------                               -----------         ------                     Scan Slide[[id number]]                                                                  CBC Auto Differential[[id number]]        Normal              Final result                 Please view results for these tests on the individual orders.    CBC Auto Differential [[id number]]  (Normal) Collected:  08/30/17 1718    Specimen:  Blood Updated:  08/30/17 1744     WBC 4.80 10*3/mm3      RBC 4.60 10*6/mm3      Hemoglobin 12.6 g/dL      Hematocrit 38.3 %      MCV 83.3 fL      MCH 27.4 pg      MCHC 32.9 g/dL      RDW 12.8 %      RDW-SD 38.5 fl      MPV 10.5 fL      Platelets 286 10*3/mm3      Neutrophil % 55.2 %      Lymphocyte % 37.7 %      Monocyte % 5.4 %      Eosinophil % 1.3 %      Basophil % 0.4 %      Immature Grans % 0.0 %      Neutrophils, Absolute 2.65 10*3/mm3       "Lymphocytes, Absolute 1.81 10*3/mm3      Monocytes, Absolute 0.26 10*3/mm3      Eosinophils, Absolute 0.06 10*3/mm3      Basophils, Absolute 0.02 10*3/mm3      Immature Grans, Absolute 0.00 10*3/mm3     Comprehensive Metabolic Panel [[id number]] Collected:  08/30/17 1718    Specimen:  Blood Updated:  08/30/17 1758     Glucose 89 mg/dL      BUN 8 mg/dL      Creatinine 0.77 mg/dL      Sodium 142 mmol/L      Potassium 3.6 mmol/L      Chloride 104 mmol/L      CO2 25.0 mmol/L      Calcium 9.7 mg/dL      Total Protein 7.8 g/dL      Albumin 4.90 g/dL      ALT (SGPT) 20 U/L      AST (SGOT) 20 U/L      Alkaline Phosphatase 57 U/L      Total Bilirubin 0.3 mg/dL      eGFR Non African Amer 86 mL/min/1.73      Globulin 2.9 gm/dL      A/G Ratio 1.7 g/dL      BUN/Creatinine Ratio 10.4     Anion Gap 13.0 mmol/L     POC Glucose Fingerstick [[id number]]  (Abnormal) Collected:  09/01/17 1122    Specimen:  Blood Updated:  09/01/17 1136     Glucose 223 (H) mg/dL     Narrative:       Meter: [id number] : [id number] DALY        /73 (BP Location: Right arm, Patient Position: Lying)  Pulse 73  Temp 98.1 °F (36.7 °C) (Oral)   Resp 20  Ht 66\" (167.6 cm)  Wt 226 lb (103 kg)  LMP 08/29/2017  SpO2 97%  BMI 36.48 kg/m2    Discharge Exam:  General Appearance:    Alert, cooperative, no distress                          Head:    Normocephalic, without obvious abnormality, atraumatic                          Eyes:                            Throat:   Lips, tongue, gums normal                          Neck:   Supple, symmetrical, trachea midline, no JVD                        Lungs:     Clear to auscultation bilaterally, respirations unlabored                Chest Wall:    No tenderness or deformity                        Heart:    Regular rate and rhythm, S1 and S2 normal, no murmur,no  Rub or gallop                  Abdomen:     Soft, non-tender, bowel sounds active, no masses, no  organomegaly                  Extremities:   " Extremities normal, atraumatic, no cyanosis or edema                             Skin:   Skin is warm and dry,  no rashes or palpable lesions                  Neurologic:   no focal deficits noted     Disposition:  Home    Patient Instructions:    Pretty Israel   Home Medication Instructions FARHAD:[id number]    Printed on:09/02/17 1249   Medication Information                      Acetaminophen (APAP 500 PO)  Take 1,000 mg by mouth Every 6 (Six) Hours.             cyclobenzaprine (FLEXERIL) 10 MG tablet  Take 5 mg by mouth 3 (Three) Times a Day As Needed for Muscle Spasms.             cyclobenzaprine (FLEXERIL) 10 MG tablet  Take 1 tablet by mouth 3 (Three) Times a Day As Needed for Muscle Spasms.             folic acid-vit B6-vit B12 (FOLTABS) 0.8-10-0.115 MG tablet tablet  Take  by mouth Daily.             ibuprofen (ADVIL,MOTRIN) 200 MG tablet  Take 800 mg by mouth Every 6 (Six) Hours As Needed for Mild Pain .             Multiple Vitamins-Minerals (MULTIVITAMIN ADULTS PO)  Take  by mouth.             oxyCODONE-acetaminophen (PERCOCET) 7.5-325 MG per tablet  Take 0.5 tablets by mouth Every 6 (Six) Hours As Needed.               Future Appointments  Date Time Provider Department Center   10/10/2017 11:30 AM  [facility]     Follow-up Information     Follow up with Jonh Thornton MD Follow up in 1 week(s).    Specialty:  Internal Medicine    Contact information:    [address] [place] [address] [phone]          Follow up with DO Ajit Leyva    Specialty:  Orthopedic Surgery    Contact information:    [facility] [place] [id number] [phone]          Discharge Order     Start     Ordered    09/02/17 1248  Discharge patient  Once     Expected Discharge Date:  09/02/17    Discharge Disposition:  Home or Self Care        09/02/17 1248          Total time spent discharging patient including evaluation,post hospitalization follow up,  medication  and post hospitalization instructions and education total time exceeds 30 minutes.    Signed:  Torin Jacques MD  9/2/2017  12:49 PM

## 2017-09-05 NOTE — PROGRESS NOTES
Continued Stay Note  [facility]     Patient Name: Pretty Israel  MRN: [id number]  Today's Date: 9/5/2017    Admit Date: 8/30/2017          Discharge Plan     None              Discharge Codes       09/05/17 1445    Discharge Codes    Discharge Codes 01  Discharge to home        Expected Discharge Date and Time     Expected Discharge Date Expected Discharge Time    Sep 2, 2017             Michelle Griffith RN

## 2017-09-13 ENCOUNTER — HOSPITAL ENCOUNTER (OUTPATIENT)
Dept: GENERAL RADIOLOGY | Facility: HOSPITAL | Age: 32
Discharge: HOME | End: 2017-09-13
Admitting: ORTHOPAEDIC SURGERY

## 2017-09-13 ENCOUNTER — APPOINTMENT (OUTPATIENT)
Dept: PREADMISSION TESTING | Facility: HOSPITAL | Age: 32
End: 2017-09-13

## 2017-09-13 VITALS
TEMPERATURE: 98.8 F | DIASTOLIC BLOOD PRESSURE: 83 MMHG | HEART RATE: 83 BPM | HEIGHT: 66 IN | WEIGHT: 218 LBS | RESPIRATION RATE: 16 BRPM | BODY MASS INDEX: 35.03 KG/M2 | SYSTOLIC BLOOD PRESSURE: 124 MMHG | OXYGEN SATURATION: 100 %

## 2017-09-13 LAB
ALBUMIN SERPL-MCNC: 4.7 G/DL (ref 3.5–5.2)
ALBUMIN/GLOB SERPL: 1.7 G/DL
ALP SERPL-CCNC: 55 U/L (ref 39–117)
ALT SERPL W P-5'-P-CCNC: 26 U/L (ref 1–33)
ANION GAP SERPL CALCULATED.3IONS-SCNC: 13.2 MMOL/L
APTT PPP: 28.6 SECONDS (ref 22.7–35.4)
AST SERPL-CCNC: 14 U/L (ref 1–32)
BILIRUB SERPL-MCNC: 0.3 MG/DL (ref 0.1–1.2)
BILIRUB UR QL STRIP: NEGATIVE
BUN BLD-MCNC: 10 MG/DL (ref 6–20)
BUN/CREAT SERPL: 13 (ref 7–25)
CALCIUM SPEC-SCNC: 9.7 MG/DL (ref 8.6–10.5)
CHLORIDE SERPL-SCNC: 103 MMOL/L (ref 98–107)
CLARITY UR: CLEAR
CO2 SERPL-SCNC: 24.8 MMOL/L (ref 22–29)
COLOR UR: YELLOW
CREAT BLD-MCNC: 0.77 MG/DL (ref 0.57–1)
DEPRECATED RDW RBC AUTO: 40.8 FL (ref 37–54)
ERYTHROCYTE [DISTWIDTH] IN BLOOD BY AUTOMATED COUNT: 13.1 % (ref 11.7–13)
GFR SERPL CREATININE-BSD FRML MDRD: 86 ML/MIN/1.73
GLOBULIN UR ELPH-MCNC: 2.7 GM/DL
GLUCOSE BLD-MCNC: 111 MG/DL (ref 65–99)
GLUCOSE UR STRIP-MCNC: NEGATIVE MG/DL
HCG SERPL QL: NEGATIVE
HCT VFR BLD AUTO: 39.4 % (ref 35.6–45.5)
HGB BLD-MCNC: 12.9 G/DL (ref 11.9–15.5)
HGB UR QL STRIP.AUTO: NEGATIVE
INR PPP: 0.96 (ref 0.9–1.1)
KETONES UR QL STRIP: NEGATIVE
LEUKOCYTE ESTERASE UR QL STRIP.AUTO: NEGATIVE
MCH RBC QN AUTO: 28.2 PG (ref 26.9–32)
MCHC RBC AUTO-ENTMCNC: 32.7 G/DL (ref 32.4–36.3)
MCV RBC AUTO: 86.2 FL (ref 80.5–98.2)
NITRITE UR QL STRIP: NEGATIVE
PH UR STRIP.AUTO: 6 [PH] (ref 5–8)
PLATELET # BLD AUTO: 280 10*3/MM3 (ref 140–500)
PMV BLD AUTO: 10.6 FL (ref 6–12)
POTASSIUM BLD-SCNC: 4.7 MMOL/L (ref 3.5–5.2)
PROT SERPL-MCNC: 7.4 G/DL (ref 6–8.5)
PROT UR QL STRIP: NEGATIVE
PROTHROMBIN TIME: 12.4 SECONDS (ref 11.7–14.2)
RBC # BLD AUTO: 4.57 10*6/MM3 (ref 3.9–5.2)
SODIUM BLD-SCNC: 141 MMOL/L (ref 136–145)
SP GR UR STRIP: 1.02 (ref 1–1.03)
UROBILINOGEN UR QL STRIP: NORMAL
WBC NRBC COR # BLD: 7.33 10*3/MM3 (ref 4.5–10.7)

## 2017-09-13 PROCEDURE — 36415 COLL VENOUS BLD VENIPUNCTURE: CPT

## 2017-09-13 PROCEDURE — 93005 ELECTROCARDIOGRAM TRACING: CPT

## 2017-09-13 PROCEDURE — 85610 PROTHROMBIN TIME: CPT | Performed by: ORTHOPAEDIC SURGERY

## 2017-09-13 PROCEDURE — 93010 ELECTROCARDIOGRAM REPORT: CPT | Performed by: INTERNAL MEDICINE

## 2017-09-13 PROCEDURE — 81003 URINALYSIS AUTO W/O SCOPE: CPT | Performed by: ORTHOPAEDIC SURGERY

## 2017-09-13 PROCEDURE — 84703 CHORIONIC GONADOTROPIN ASSAY: CPT | Performed by: ORTHOPAEDIC SURGERY

## 2017-09-13 PROCEDURE — 80053 COMPREHEN METABOLIC PANEL: CPT | Performed by: ORTHOPAEDIC SURGERY

## 2017-09-13 PROCEDURE — 85730 THROMBOPLASTIN TIME PARTIAL: CPT | Performed by: ORTHOPAEDIC SURGERY

## 2017-09-13 PROCEDURE — 71020 HC CHEST PA AND LATERAL: CPT

## 2017-09-13 PROCEDURE — 85027 COMPLETE CBC AUTOMATED: CPT | Performed by: ORTHOPAEDIC SURGERY

## 2017-09-13 RX ORDER — VALACYCLOVIR HYDROCHLORIDE 1 G/1
1000 TABLET, FILM COATED ORAL 2 TIMES DAILY PRN
COMMUNITY
Start: 2017-09-08 | End: 2017-09-18 | Stop reason: HOSPADM

## 2017-09-13 NOTE — DISCHARGE INSTRUCTIONS
Take the following medications the morning of surgery with a small sip of water:        General Instructions:  • Do not eat solid food after midnight the night before surgery.  • You may drink clear liquids day of surgery but must stop at least one hour before your hospital arrival time.  It is beneficial for you to have a clear drink that contains carbohydrates the day of surgery.  We suggest a 20 ounce bottle of Gatorade or Powerade for non-diabetic patients or a 20 ounce bottle of G2 or Powerade Zero for diabetic patients.     Clear liquids are liquids you can see through.  Nothing red in color.     Plain water                               Sports drinks  Sodas                                   Gelatin (Jell-O)  Fruit juices without pulp such as white grape juice and apple juice  Popsicles that contain no fruit or yogurt  Tea or coffee (no cream or milk added)  Gatorade / Powerade  G2 / Powerade Zero    • Bring any papers given to you in the doctor’s office.  • Wear clean comfortable clothes and socks.  • Do not wear contact lenses or make-up.  Bring a case for your glasses.   • Bring crutches or walker if applicable.  • Leave all other valuables and jewelry at home.  • The Pre-Admission Testing nurse will instruct you to bring medications if unable to obtain an accurate list in Pre-Admission Testing.            Preventing a Surgical Site Infection:  • For 2 to 3 days before surgery, avoid shaving with a razor because the razor can irritate skin and make it easier to develop an infection.  • The night prior to surgery sleep in a clean bed with clean clothing.  Do not allow pets to sleep with you.  • Shower on the morning of surgery using a fresh bar of anti-bacterial soap (such as Dial) and clean washcloth.  Dry with a clean towel and dress in clean clothing.  • Ask your surgeon if you will be receiving antibiotics prior to surgery.  • Make sure you, your family, and all healthcare providers clean their hands  with soap and water or an alcohol based hand  before caring for you or your wound.    Day of surgery: 9/15/2017. MAIN OR. ARRIVAL TIME 7 AM  Upon arrival, a Pre-op nurse and Anesthesiologist will review your health history, obtain vital signs, and answer questions you may have.  The only belongings needed at this time will be your home medications and if applicable your C-PAP/BI-PAP machine.  If you are staying overnight your family can leave the rest of your belongings in the car and bring them to your room later.  A Pre-op nurse will start an IV and you may receive medication in preparation for surgery, including something to help you relax.  Your family will be able to see you in the Pre-op area.  While you are in surgery your family should notify the waiting room  if they leave the waiting room area and provide a contact phone number.    Please be aware that surgery does come with discomfort.  We want to make every effort to control your discomfort so please discuss any uncontrolled symptoms with your nurse.   Your doctor will most likely have prescribed pain medications.          If you are staying overnight following surgery, you will be transported to your hospital room following the recovery period.  [facility] has all private rooms.    If you have any questions please call Pre-Admission Testing at [phone].  Deductibles and co-payments are collected on the day of service. Please be prepared to pay the required co-pay, deductible or deposit on the day of service as defined by your plan.

## 2017-09-14 RX ORDER — GABAPENTIN 300 MG/1
600 CAPSULE ORAL ONCE
Status: COMPLETED | OUTPATIENT
Start: 2017-09-14 | End: 2017-09-15

## 2017-09-14 RX ORDER — OXYCODONE HCL 10 MG/1
10 TABLET, FILM COATED, EXTENDED RELEASE ORAL ONCE
Status: COMPLETED | OUTPATIENT
Start: 2017-09-14 | End: 2017-09-15

## 2017-09-14 RX ORDER — ACETAMINOPHEN 10 MG/ML
1000 INJECTION, SOLUTION INTRAVENOUS ONCE
Status: COMPLETED | OUTPATIENT
Start: 2017-09-14 | End: 2017-09-15

## 2017-09-15 ENCOUNTER — HOSPITAL ENCOUNTER (INPATIENT)
Facility: HOSPITAL | Age: 32
LOS: 3 days | Discharge: NURSING, REHAB OR LONG-TERM CARE | End: 2017-09-18
Attending: ORTHOPAEDIC SURGERY | Admitting: ORTHOPAEDIC SURGERY

## 2017-09-15 ENCOUNTER — ANESTHESIA (OUTPATIENT)
Dept: PERIOP | Facility: HOSPITAL | Age: 32
End: 2017-09-15

## 2017-09-15 ENCOUNTER — APPOINTMENT (OUTPATIENT)
Dept: GENERAL RADIOLOGY | Facility: HOSPITAL | Age: 32
End: 2017-09-15

## 2017-09-15 ENCOUNTER — ANESTHESIA EVENT (OUTPATIENT)
Dept: PERIOP | Facility: HOSPITAL | Age: 32
End: 2017-09-15

## 2017-09-15 PROBLEM — F41.0 PANIC ATTACK: Status: ACTIVE | Noted: 2017-09-15

## 2017-09-15 PROBLEM — M48.061 STENOSIS, SPINAL, LUMBAR: Status: ACTIVE | Noted: 2017-09-15

## 2017-09-15 PROBLEM — R00.0 CHRONIC TACHYCARDIA: Status: ACTIVE | Noted: 2017-09-15

## 2017-09-15 LAB
ABO GROUP BLD: NORMAL
BLD GP AB SCN SERPL QL: NEGATIVE
RH BLD: POSITIVE

## 2017-09-15 PROCEDURE — C1713 ANCHOR/SCREW BN/BN,TIS/BN: HCPCS | Performed by: ORTHOPAEDIC SURGERY

## 2017-09-15 PROCEDURE — 76000 FLUOROSCOPY <1 HR PHYS/QHP: CPT

## 2017-09-15 PROCEDURE — 25010000002 MIDAZOLAM PER 1 MG

## 2017-09-15 PROCEDURE — 25010000002 FENTANYL CITRATE (PF) 100 MCG/2ML SOLUTION: Performed by: NURSE ANESTHETIST, CERTIFIED REGISTERED

## 2017-09-15 PROCEDURE — 25010000002 MIDAZOLAM PER 1 MG: Performed by: ANESTHESIOLOGY

## 2017-09-15 PROCEDURE — 25010000002 ONDANSETRON PER 1 MG: Performed by: NURSE ANESTHETIST, CERTIFIED REGISTERED

## 2017-09-15 PROCEDURE — 25010000003 CEFAZOLIN IN DEXTROSE 2-4 GM/100ML-% SOLUTION: Performed by: ORTHOPAEDIC SURGERY

## 2017-09-15 PROCEDURE — 25010000002 HYDROMORPHONE PER 4 MG: Performed by: NURSE ANESTHETIST, CERTIFIED REGISTERED

## 2017-09-15 PROCEDURE — 72100 X-RAY EXAM L-S SPINE 2/3 VWS: CPT

## 2017-09-15 PROCEDURE — 25010000002 SUCCINYLCHOLINE PER 20 MG: Performed by: NURSE ANESTHETIST, CERTIFIED REGISTERED

## 2017-09-15 PROCEDURE — 86850 RBC ANTIBODY SCREEN: CPT | Performed by: ORTHOPAEDIC SURGERY

## 2017-09-15 PROCEDURE — 25010000002 DEXAMETHASONE PER 1 MG: Performed by: NURSE ANESTHETIST, CERTIFIED REGISTERED

## 2017-09-15 PROCEDURE — 86900 BLOOD TYPING SEROLOGIC ABO: CPT | Performed by: ORTHOPAEDIC SURGERY

## 2017-09-15 PROCEDURE — 0ST40ZZ RESECTION OF LUMBOSACRAL DISC, OPEN APPROACH: ICD-10-PCS | Performed by: ORTHOPAEDIC SURGERY

## 2017-09-15 PROCEDURE — 07DR3ZZ EXTRACTION OF ILIAC BONE MARROW, PERCUTANEOUS APPROACH: ICD-10-PCS | Performed by: ORTHOPAEDIC SURGERY

## 2017-09-15 PROCEDURE — 0SG30A0 FUSION OF LUMBOSACRAL JOINT WITH INTERBODY FUSION DEVICE, ANTERIOR APPROACH, ANTERIOR COLUMN, OPEN APPROACH: ICD-10-PCS | Performed by: ORTHOPAEDIC SURGERY

## 2017-09-15 PROCEDURE — 25010000002 METHOCARBAMOL 1000 MG/10ML SOLUTION 10 ML VIAL: Performed by: ORTHOPAEDIC SURGERY

## 2017-09-15 PROCEDURE — 25010000002 PROPOFOL 10 MG/ML EMULSION: Performed by: NURSE ANESTHETIST, CERTIFIED REGISTERED

## 2017-09-15 PROCEDURE — 86901 BLOOD TYPING SEROLOGIC RH(D): CPT | Performed by: ORTHOPAEDIC SURGERY

## 2017-09-15 PROCEDURE — 25010000002 MIDAZOLAM PER 1 MG: Performed by: NURSE ANESTHETIST, CERTIFIED REGISTERED

## 2017-09-15 PROCEDURE — 25010000002 DEXAMETHASONE PER 1 MG: Performed by: ORTHOPAEDIC SURGERY

## 2017-09-15 DEVICE — PUTTY DBM PROGENIX PLS 10CC: Type: IMPLANTABLE DEVICE | Site: SPINE LUMBAR | Status: FUNCTIONAL

## 2017-09-15 DEVICE — PRE-CONTOURED / C.P. TI ROD 5.5MM X 4CM
Type: IMPLANTABLE DEVICE | Site: SPINE LUMBAR | Status: FUNCTIONAL
Brand: ILLICO

## 2017-09-15 DEVICE — ALLOGRFT BONE VIVIGEN CELLUAR MATRX FZ 10CC: Type: IMPLANTABLE DEVICE | Site: SPINE LUMBAR | Status: FUNCTIONAL

## 2017-09-15 DEVICE — TITANIUM HEXALOBE SET SCREW
Type: IMPLANTABLE DEVICE | Site: SPINE LUMBAR | Status: FUNCTIONAL
Brand: ZODIAC

## 2017-09-15 DEVICE — TI CANNULATED POLYAXIAL SCREW 6.5MM X 40MM
Type: IMPLANTABLE DEVICE | Site: SPINE LUMBAR | Status: FUNCTIONAL
Brand: ILLICO

## 2017-09-15 DEVICE — IMPLANTABLE DEVICE: Type: IMPLANTABLE DEVICE | Status: FUNCTIONAL

## 2017-09-15 DEVICE — BONE CANC CHIPS 1/4MM 15CC: Type: IMPLANTABLE DEVICE | Site: SPINE LUMBAR | Status: FUNCTIONAL

## 2017-09-15 RX ORDER — DOCUSATE SODIUM 100 MG/1
100 CAPSULE, LIQUID FILLED ORAL 2 TIMES DAILY PRN
Status: DISCONTINUED | OUTPATIENT
Start: 2017-09-15 | End: 2017-09-18 | Stop reason: HOSPADM

## 2017-09-15 RX ORDER — LIDOCAINE HYDROCHLORIDE 20 MG/ML
INJECTION, SOLUTION INFILTRATION; PERINEURAL AS NEEDED
Status: DISCONTINUED | OUTPATIENT
Start: 2017-09-15 | End: 2017-09-15 | Stop reason: SURG

## 2017-09-15 RX ORDER — MIDAZOLAM HYDROCHLORIDE 1 MG/ML
INJECTION INTRAMUSCULAR; INTRAVENOUS AS NEEDED
Status: DISCONTINUED | OUTPATIENT
Start: 2017-09-15 | End: 2017-09-15 | Stop reason: SURG

## 2017-09-15 RX ORDER — ESMOLOL HYDROCHLORIDE 10 MG/ML
INJECTION INTRAVENOUS AS NEEDED
Status: DISCONTINUED | OUTPATIENT
Start: 2017-09-15 | End: 2017-09-15 | Stop reason: SURG

## 2017-09-15 RX ORDER — ONDANSETRON 2 MG/ML
INJECTION INTRAMUSCULAR; INTRAVENOUS AS NEEDED
Status: DISCONTINUED | OUTPATIENT
Start: 2017-09-15 | End: 2017-09-15 | Stop reason: SURG

## 2017-09-15 RX ORDER — HYDROMORPHONE HCL IN 0.9% NACL 10 MG/50ML
PATIENT CONTROLLED ANALGESIA SYRINGE INTRAVENOUS CONTINUOUS
Status: DISPENSED | OUTPATIENT
Start: 2017-09-15 | End: 2017-09-16

## 2017-09-15 RX ORDER — ONDANSETRON 2 MG/ML
4 INJECTION INTRAMUSCULAR; INTRAVENOUS EVERY 6 HOURS PRN
Status: DISCONTINUED | OUTPATIENT
Start: 2017-09-15 | End: 2017-09-18 | Stop reason: HOSPADM

## 2017-09-15 RX ORDER — MIDAZOLAM HYDROCHLORIDE 1 MG/ML
INJECTION INTRAMUSCULAR; INTRAVENOUS
Status: COMPLETED
Start: 2017-09-15 | End: 2017-09-15

## 2017-09-15 RX ORDER — LIDOCAINE HYDROCHLORIDE 40 MG/ML
SOLUTION TOPICAL AS NEEDED
Status: DISCONTINUED | OUTPATIENT
Start: 2017-09-15 | End: 2017-09-15 | Stop reason: SURG

## 2017-09-15 RX ORDER — FENTANYL CITRATE 50 UG/ML
50 INJECTION, SOLUTION INTRAMUSCULAR; INTRAVENOUS
Status: DISCONTINUED | OUTPATIENT
Start: 2017-09-15 | End: 2017-09-15 | Stop reason: HOSPADM

## 2017-09-15 RX ORDER — SODIUM CHLORIDE 0.9 % (FLUSH) 0.9 %
1-10 SYRINGE (ML) INJECTION AS NEEDED
Status: DISCONTINUED | OUTPATIENT
Start: 2017-09-15 | End: 2017-09-15 | Stop reason: HOSPADM

## 2017-09-15 RX ORDER — SODIUM CHLORIDE 450 MG/100ML
100 INJECTION, SOLUTION INTRAVENOUS CONTINUOUS
Status: DISCONTINUED | OUTPATIENT
Start: 2017-09-15 | End: 2017-09-16

## 2017-09-15 RX ORDER — HYDROMORPHONE HYDROCHLORIDE 1 MG/ML
0.5 INJECTION, SOLUTION INTRAMUSCULAR; INTRAVENOUS; SUBCUTANEOUS
Status: DISCONTINUED | OUTPATIENT
Start: 2017-09-15 | End: 2017-09-15 | Stop reason: HOSPADM

## 2017-09-15 RX ORDER — ONDANSETRON 4 MG/1
4 TABLET, FILM COATED ORAL EVERY 6 HOURS PRN
Status: DISCONTINUED | OUTPATIENT
Start: 2017-09-15 | End: 2017-09-18 | Stop reason: HOSPADM

## 2017-09-15 RX ORDER — ONDANSETRON 4 MG/1
4 TABLET, ORALLY DISINTEGRATING ORAL EVERY 6 HOURS PRN
Status: DISCONTINUED | OUTPATIENT
Start: 2017-09-15 | End: 2017-09-18 | Stop reason: HOSPADM

## 2017-09-15 RX ORDER — LABETALOL HYDROCHLORIDE 5 MG/ML
INJECTION, SOLUTION INTRAVENOUS AS NEEDED
Status: DISCONTINUED | OUTPATIENT
Start: 2017-09-15 | End: 2017-09-15 | Stop reason: SURG

## 2017-09-15 RX ORDER — CEFAZOLIN SODIUM 2 G/100ML
2 INJECTION, SOLUTION INTRAVENOUS ONCE
Status: COMPLETED | OUTPATIENT
Start: 2017-09-15 | End: 2017-09-15

## 2017-09-15 RX ORDER — PROMETHAZINE HYDROCHLORIDE 25 MG/1
25 SUPPOSITORY RECTAL ONCE AS NEEDED
Status: DISCONTINUED | OUTPATIENT
Start: 2017-09-15 | End: 2017-09-15 | Stop reason: HOSPADM

## 2017-09-15 RX ORDER — NALOXONE HCL 0.4 MG/ML
0.1 VIAL (ML) INJECTION
Status: DISCONTINUED | OUTPATIENT
Start: 2017-09-15 | End: 2017-09-18 | Stop reason: HOSPADM

## 2017-09-15 RX ORDER — FENTANYL CITRATE 50 UG/ML
INJECTION, SOLUTION INTRAMUSCULAR; INTRAVENOUS AS NEEDED
Status: DISCONTINUED | OUTPATIENT
Start: 2017-09-15 | End: 2017-09-15 | Stop reason: SURG

## 2017-09-15 RX ORDER — ALPRAZOLAM 0.5 MG/1
0.5 TABLET ORAL 3 TIMES DAILY PRN
Status: DISCONTINUED | OUTPATIENT
Start: 2017-09-15 | End: 2017-09-18 | Stop reason: HOSPADM

## 2017-09-15 RX ORDER — PROMETHAZINE HYDROCHLORIDE 25 MG/ML
12.5 INJECTION, SOLUTION INTRAMUSCULAR; INTRAVENOUS ONCE AS NEEDED
Status: DISCONTINUED | OUTPATIENT
Start: 2017-09-15 | End: 2017-09-15 | Stop reason: HOSPADM

## 2017-09-15 RX ORDER — PROPOFOL 10 MG/ML
VIAL (ML) INTRAVENOUS AS NEEDED
Status: DISCONTINUED | OUTPATIENT
Start: 2017-09-15 | End: 2017-09-15 | Stop reason: SURG

## 2017-09-15 RX ORDER — ROCURONIUM BROMIDE 10 MG/ML
INJECTION, SOLUTION INTRAVENOUS AS NEEDED
Status: DISCONTINUED | OUTPATIENT
Start: 2017-09-15 | End: 2017-09-15 | Stop reason: SURG

## 2017-09-15 RX ORDER — PROMETHAZINE HYDROCHLORIDE 12.5 MG/1
12.5 SUPPOSITORY RECTAL EVERY 6 HOURS PRN
Status: DISCONTINUED | OUTPATIENT
Start: 2017-09-15 | End: 2017-09-18 | Stop reason: HOSPADM

## 2017-09-15 RX ORDER — HYDROMORPHONE HCL 110MG/55ML
PATIENT CONTROLLED ANALGESIA SYRINGE INTRAVENOUS AS NEEDED
Status: DISCONTINUED | OUTPATIENT
Start: 2017-09-15 | End: 2017-09-15 | Stop reason: SURG

## 2017-09-15 RX ORDER — OXYCODONE AND ACETAMINOPHEN 7.5; 325 MG/1; MG/1
2 TABLET ORAL EVERY 4 HOURS PRN
Status: DISCONTINUED | OUTPATIENT
Start: 2017-09-15 | End: 2017-09-18 | Stop reason: HOSPADM

## 2017-09-15 RX ORDER — PROMETHAZINE HYDROCHLORIDE 12.5 MG/1
12.5 TABLET ORAL EVERY 6 HOURS PRN
Status: DISCONTINUED | OUTPATIENT
Start: 2017-09-15 | End: 2017-09-18 | Stop reason: HOSPADM

## 2017-09-15 RX ORDER — DIPHENHYDRAMINE HCL 25 MG
25 CAPSULE ORAL EVERY 6 HOURS PRN
Status: DISCONTINUED | OUTPATIENT
Start: 2017-09-15 | End: 2017-09-18 | Stop reason: HOSPADM

## 2017-09-15 RX ORDER — ONDANSETRON 2 MG/ML
4 INJECTION INTRAMUSCULAR; INTRAVENOUS ONCE AS NEEDED
Status: DISCONTINUED | OUTPATIENT
Start: 2017-09-15 | End: 2017-09-15 | Stop reason: HOSPADM

## 2017-09-15 RX ORDER — PROMETHAZINE HYDROCHLORIDE 25 MG/ML
12.5 INJECTION, SOLUTION INTRAMUSCULAR; INTRAVENOUS EVERY 6 HOURS PRN
Status: DISCONTINUED | OUTPATIENT
Start: 2017-09-15 | End: 2017-09-18 | Stop reason: HOSPADM

## 2017-09-15 RX ORDER — MIDAZOLAM HYDROCHLORIDE 1 MG/ML
2 INJECTION INTRAMUSCULAR; INTRAVENOUS
Status: DISCONTINUED | OUTPATIENT
Start: 2017-09-15 | End: 2017-09-15 | Stop reason: HOSPADM

## 2017-09-15 RX ORDER — MIDAZOLAM HYDROCHLORIDE 1 MG/ML
1 INJECTION INTRAMUSCULAR; INTRAVENOUS
Status: DISCONTINUED | OUTPATIENT
Start: 2017-09-15 | End: 2017-09-15 | Stop reason: HOSPADM

## 2017-09-15 RX ORDER — PROMETHAZINE HYDROCHLORIDE 25 MG/1
12.5 TABLET ORAL ONCE AS NEEDED
Status: DISCONTINUED | OUTPATIENT
Start: 2017-09-15 | End: 2017-09-15 | Stop reason: HOSPADM

## 2017-09-15 RX ORDER — MIDAZOLAM HYDROCHLORIDE 5 MG/ML
1 INJECTION INTRAMUSCULAR; INTRAVENOUS ONCE
Status: COMPLETED | OUTPATIENT
Start: 2017-09-15 | End: 2017-09-15

## 2017-09-15 RX ORDER — SODIUM CHLORIDE 0.9 % (FLUSH) 0.9 %
1-10 SYRINGE (ML) INJECTION AS NEEDED
Status: DISCONTINUED | OUTPATIENT
Start: 2017-09-15 | End: 2017-09-18 | Stop reason: HOSPADM

## 2017-09-15 RX ORDER — DIPHENHYDRAMINE HYDROCHLORIDE 50 MG/ML
12.5 INJECTION INTRAMUSCULAR; INTRAVENOUS
Status: DISCONTINUED | OUTPATIENT
Start: 2017-09-15 | End: 2017-09-15 | Stop reason: HOSPADM

## 2017-09-15 RX ORDER — SODIUM CHLORIDE 9 MG/ML
INJECTION, SOLUTION INTRAVENOUS AS NEEDED
Status: DISCONTINUED | OUTPATIENT
Start: 2017-09-15 | End: 2017-09-15 | Stop reason: HOSPADM

## 2017-09-15 RX ORDER — LABETALOL HYDROCHLORIDE 5 MG/ML
5 INJECTION, SOLUTION INTRAVENOUS
Status: DISCONTINUED | OUTPATIENT
Start: 2017-09-15 | End: 2017-09-15 | Stop reason: HOSPADM

## 2017-09-15 RX ORDER — FLUMAZENIL 0.1 MG/ML
0.2 INJECTION INTRAVENOUS AS NEEDED
Status: DISCONTINUED | OUTPATIENT
Start: 2017-09-15 | End: 2017-09-15 | Stop reason: HOSPADM

## 2017-09-15 RX ORDER — CYCLOBENZAPRINE HCL 10 MG
10 TABLET ORAL 3 TIMES DAILY PRN
Status: DISCONTINUED | OUTPATIENT
Start: 2017-09-15 | End: 2017-09-18 | Stop reason: HOSPADM

## 2017-09-15 RX ORDER — BISACODYL 5 MG/1
5 TABLET, DELAYED RELEASE ORAL DAILY PRN
Status: DISCONTINUED | OUTPATIENT
Start: 2017-09-15 | End: 2017-09-18 | Stop reason: HOSPADM

## 2017-09-15 RX ORDER — MIDAZOLAM HYDROCHLORIDE 1 MG/ML
2 INJECTION INTRAMUSCULAR; INTRAVENOUS ONCE
Status: COMPLETED | OUTPATIENT
Start: 2017-09-15 | End: 2017-09-15

## 2017-09-15 RX ORDER — POLYETHYLENE GLYCOL 3350 17 G/17G
17 POWDER, FOR SOLUTION ORAL DAILY PRN
Status: DISCONTINUED | OUTPATIENT
Start: 2017-09-15 | End: 2017-09-18 | Stop reason: HOSPADM

## 2017-09-15 RX ORDER — PROMETHAZINE HYDROCHLORIDE 25 MG/1
25 TABLET ORAL ONCE AS NEEDED
Status: DISCONTINUED | OUTPATIENT
Start: 2017-09-15 | End: 2017-09-15 | Stop reason: HOSPADM

## 2017-09-15 RX ORDER — EPHEDRINE SULFATE 50 MG/ML
5 INJECTION, SOLUTION INTRAVENOUS ONCE AS NEEDED
Status: DISCONTINUED | OUTPATIENT
Start: 2017-09-15 | End: 2017-09-15 | Stop reason: HOSPADM

## 2017-09-15 RX ORDER — DEXAMETHASONE SODIUM PHOSPHATE 10 MG/ML
INJECTION INTRAMUSCULAR; INTRAVENOUS AS NEEDED
Status: DISCONTINUED | OUTPATIENT
Start: 2017-09-15 | End: 2017-09-15 | Stop reason: SURG

## 2017-09-15 RX ORDER — SODIUM CHLORIDE, SODIUM LACTATE, POTASSIUM CHLORIDE, CALCIUM CHLORIDE 600; 310; 30; 20 MG/100ML; MG/100ML; MG/100ML; MG/100ML
9 INJECTION, SOLUTION INTRAVENOUS CONTINUOUS
Status: DISCONTINUED | OUTPATIENT
Start: 2017-09-15 | End: 2017-09-15

## 2017-09-15 RX ORDER — FAMOTIDINE 10 MG/ML
20 INJECTION, SOLUTION INTRAVENOUS ONCE
Status: COMPLETED | OUTPATIENT
Start: 2017-09-15 | End: 2017-09-15

## 2017-09-15 RX ORDER — SODIUM CHLORIDE 9 MG/ML
125 INJECTION, SOLUTION INTRAVENOUS CONTINUOUS
Status: DISCONTINUED | OUTPATIENT
Start: 2017-09-15 | End: 2017-09-16

## 2017-09-15 RX ORDER — SENNA AND DOCUSATE SODIUM 50; 8.6 MG/1; MG/1
1 TABLET, FILM COATED ORAL NIGHTLY PRN
Status: DISCONTINUED | OUTPATIENT
Start: 2017-09-15 | End: 2017-09-18 | Stop reason: HOSPADM

## 2017-09-15 RX ORDER — HYDRALAZINE HYDROCHLORIDE 20 MG/ML
5 INJECTION INTRAMUSCULAR; INTRAVENOUS
Status: DISCONTINUED | OUTPATIENT
Start: 2017-09-15 | End: 2017-09-15 | Stop reason: HOSPADM

## 2017-09-15 RX ORDER — OXYCODONE AND ACETAMINOPHEN 7.5; 325 MG/1; MG/1
1 TABLET ORAL ONCE AS NEEDED
Status: COMPLETED | OUTPATIENT
Start: 2017-09-15 | End: 2017-09-15

## 2017-09-15 RX ORDER — BISACODYL 10 MG
10 SUPPOSITORY, RECTAL RECTAL DAILY PRN
Status: DISCONTINUED | OUTPATIENT
Start: 2017-09-15 | End: 2017-09-18 | Stop reason: HOSPADM

## 2017-09-15 RX ORDER — MIDAZOLAM HYDROCHLORIDE 5 MG/ML
1 INJECTION INTRAMUSCULAR; INTRAVENOUS
Status: DISCONTINUED | OUTPATIENT
Start: 2017-09-15 | End: 2017-09-15

## 2017-09-15 RX ORDER — SUCCINYLCHOLINE CHLORIDE 20 MG/ML
INJECTION INTRAMUSCULAR; INTRAVENOUS AS NEEDED
Status: DISCONTINUED | OUTPATIENT
Start: 2017-09-15 | End: 2017-09-15 | Stop reason: SURG

## 2017-09-15 RX ORDER — CEFAZOLIN SODIUM 2 G/100ML
2 INJECTION, SOLUTION INTRAVENOUS EVERY 8 HOURS
Status: COMPLETED | OUTPATIENT
Start: 2017-09-15 | End: 2017-09-16

## 2017-09-15 RX ORDER — NALOXONE HCL 0.4 MG/ML
0.2 VIAL (ML) INJECTION AS NEEDED
Status: DISCONTINUED | OUTPATIENT
Start: 2017-09-15 | End: 2017-09-15 | Stop reason: HOSPADM

## 2017-09-15 RX ORDER — HYDROCODONE BITARTRATE AND ACETAMINOPHEN 7.5; 325 MG/1; MG/1
1 TABLET ORAL ONCE AS NEEDED
Status: DISCONTINUED | OUTPATIENT
Start: 2017-09-15 | End: 2017-09-15 | Stop reason: HOSPADM

## 2017-09-15 RX ORDER — VALACYCLOVIR HYDROCHLORIDE 500 MG/1
1000 TABLET, FILM COATED ORAL 2 TIMES DAILY PRN
Status: DISCONTINUED | OUTPATIENT
Start: 2017-09-15 | End: 2017-09-18 | Stop reason: HOSPADM

## 2017-09-15 RX ORDER — BUPIVACAINE HYDROCHLORIDE AND EPINEPHRINE 5; 5 MG/ML; UG/ML
INJECTION, SOLUTION PERINEURAL AS NEEDED
Status: DISCONTINUED | OUTPATIENT
Start: 2017-09-15 | End: 2017-09-15 | Stop reason: HOSPADM

## 2017-09-15 RX ADMIN — LABETALOL HYDROCHLORIDE 5 MG: 5 INJECTION, SOLUTION INTRAVENOUS at 14:02

## 2017-09-15 RX ADMIN — LABETALOL HYDROCHLORIDE 5 MG: 5 INJECTION, SOLUTION INTRAVENOUS at 14:05

## 2017-09-15 RX ADMIN — PROPOFOL 200 MG: 10 INJECTION, EMULSION INTRAVENOUS at 10:43

## 2017-09-15 RX ADMIN — HYDROMORPHONE HYDROCHLORIDE 0.5 MG: 2 INJECTION, SOLUTION INTRAMUSCULAR; INTRAVENOUS; SUBCUTANEOUS at 12:30

## 2017-09-15 RX ADMIN — MIDAZOLAM HYDROCHLORIDE 1 MG: 5 INJECTION, SOLUTION INTRAMUSCULAR; INTRAVENOUS at 16:37

## 2017-09-15 RX ADMIN — ACETAMINOPHEN 1000 MG: 10 INJECTION, SOLUTION INTRAVENOUS at 10:49

## 2017-09-15 RX ADMIN — ESMOLOL HYDROCHLORIDE 10 MG: 10 INJECTION, SOLUTION INTRAVENOUS at 11:12

## 2017-09-15 RX ADMIN — SODIUM CHLORIDE 125 ML/HR: 9 INJECTION, SOLUTION INTRAVENOUS at 19:22

## 2017-09-15 RX ADMIN — MIDAZOLAM HYDROCHLORIDE 2 MG: 1 INJECTION, SOLUTION INTRAMUSCULAR; INTRAVENOUS at 10:39

## 2017-09-15 RX ADMIN — FENTANYL CITRATE 50 MCG: 50 INJECTION INTRAMUSCULAR; INTRAVENOUS at 10:39

## 2017-09-15 RX ADMIN — ONDANSETRON 4 MG: 2 INJECTION INTRAMUSCULAR; INTRAVENOUS at 13:46

## 2017-09-15 RX ADMIN — OXYCODONE HYDROCHLORIDE AND ACETAMINOPHEN 2 TABLET: 7.5; 325 TABLET ORAL at 18:34

## 2017-09-15 RX ADMIN — MIDAZOLAM 1 MG: 1 INJECTION INTRAMUSCULAR; INTRAVENOUS at 15:11

## 2017-09-15 RX ADMIN — MIDAZOLAM 2 MG: 1 INJECTION INTRAMUSCULAR; INTRAVENOUS at 09:25

## 2017-09-15 RX ADMIN — OXYCODONE HYDROCHLORIDE AND ACETAMINOPHEN 1 TABLET: 7.5; 325 TABLET ORAL at 14:56

## 2017-09-15 RX ADMIN — SODIUM CHLORIDE, POTASSIUM CHLORIDE, SODIUM LACTATE AND CALCIUM CHLORIDE 9 ML/HR: 600; 310; 30; 20 INJECTION, SOLUTION INTRAVENOUS at 09:25

## 2017-09-15 RX ADMIN — FENTANYL CITRATE 50 MCG: 50 INJECTION INTRAMUSCULAR; INTRAVENOUS at 14:50

## 2017-09-15 RX ADMIN — FENTANYL CITRATE 50 MCG: 50 INJECTION INTRAMUSCULAR; INTRAVENOUS at 16:26

## 2017-09-15 RX ADMIN — Medication: at 14:44

## 2017-09-15 RX ADMIN — LIDOCAINE HYDROCHLORIDE 60 MG: 20 INJECTION, SOLUTION INFILTRATION; PERINEURAL at 10:43

## 2017-09-15 RX ADMIN — CEFAZOLIN SODIUM 2 G: 2 INJECTION, SOLUTION INTRAVENOUS at 10:37

## 2017-09-15 RX ADMIN — HYDROMORPHONE HYDROCHLORIDE 0.5 MG: 1 INJECTION, SOLUTION INTRAMUSCULAR; INTRAVENOUS; SUBCUTANEOUS at 15:05

## 2017-09-15 RX ADMIN — DEXAMETHASONE SODIUM PHOSPHATE 10 MG: 10 INJECTION INTRAMUSCULAR; INTRAVENOUS at 08:39

## 2017-09-15 RX ADMIN — SODIUM CHLORIDE, POTASSIUM CHLORIDE, SODIUM LACTATE AND CALCIUM CHLORIDE: 600; 310; 30; 20 INJECTION, SOLUTION INTRAVENOUS at 10:36

## 2017-09-15 RX ADMIN — OXYCODONE HYDROCHLORIDE AND ACETAMINOPHEN 2 TABLET: 7.5; 325 TABLET ORAL at 22:30

## 2017-09-15 RX ADMIN — CYCLOBENZAPRINE HYDROCHLORIDE 10 MG: 10 TABLET, FILM COATED ORAL at 19:59

## 2017-09-15 RX ADMIN — CEFAZOLIN SODIUM 2 G: 2 INJECTION, SOLUTION INTRAVENOUS at 19:22

## 2017-09-15 RX ADMIN — FENTANYL CITRATE 50 MCG: 50 INJECTION INTRAMUSCULAR; INTRAVENOUS at 11:08

## 2017-09-15 RX ADMIN — METHOCARBAMOL 1000 MG: 100 INJECTION, SOLUTION INTRAMUSCULAR; INTRAVENOUS at 15:15

## 2017-09-15 RX ADMIN — SUCCINYLCHOLINE CHLORIDE 120 MG: 20 INJECTION, SOLUTION INTRAMUSCULAR; INTRAVENOUS; PARENTERAL at 10:43

## 2017-09-15 RX ADMIN — LABETALOL HYDROCHLORIDE 5 MG: 5 INJECTION, SOLUTION INTRAVENOUS at 13:44

## 2017-09-15 RX ADMIN — LIDOCAINE HYDROCHLORIDE 1 EACH: 40 SPRAY LARYNGEAL; TRANSTRACHEAL at 10:45

## 2017-09-15 RX ADMIN — SODIUM CHLORIDE, POTASSIUM CHLORIDE, SODIUM LACTATE AND CALCIUM CHLORIDE: 600; 310; 30; 20 INJECTION, SOLUTION INTRAVENOUS at 12:34

## 2017-09-15 RX ADMIN — ESMOLOL HYDROCHLORIDE 10 MG: 10 INJECTION, SOLUTION INTRAVENOUS at 12:26

## 2017-09-15 RX ADMIN — GABAPENTIN 600 MG: 300 CAPSULE ORAL at 08:38

## 2017-09-15 RX ADMIN — HYDROMORPHONE HYDROCHLORIDE 0.5 MG: 2 INJECTION, SOLUTION INTRAMUSCULAR; INTRAVENOUS; SUBCUTANEOUS at 13:13

## 2017-09-15 RX ADMIN — ALPRAZOLAM 0.5 MG: 0.5 TABLET ORAL at 19:22

## 2017-09-15 RX ADMIN — HYDROMORPHONE HYDROCHLORIDE 0.5 MG: 1 INJECTION, SOLUTION INTRAMUSCULAR; INTRAVENOUS; SUBCUTANEOUS at 14:56

## 2017-09-15 RX ADMIN — FAMOTIDINE 20 MG: 10 INJECTION, SOLUTION INTRAVENOUS at 09:25

## 2017-09-15 RX ADMIN — HYDROMORPHONE HYDROCHLORIDE 0.5 MG: 2 INJECTION, SOLUTION INTRAMUSCULAR; INTRAVENOUS; SUBCUTANEOUS at 12:59

## 2017-09-15 RX ADMIN — DEXAMETHASONE SODIUM PHOSPHATE 8 MG: 10 INJECTION INTRAMUSCULAR; INTRAVENOUS at 10:54

## 2017-09-15 RX ADMIN — OXYCODONE HYDROCHLORIDE 10 MG: 10 TABLET, FILM COATED, EXTENDED RELEASE ORAL at 08:38

## 2017-09-15 RX ADMIN — MIDAZOLAM HYDROCHLORIDE 1 MG: 1 INJECTION INTRAMUSCULAR; INTRAVENOUS at 15:11

## 2017-09-15 RX ADMIN — HYDROMORPHONE HYDROCHLORIDE 0.5 MG: 1 INJECTION, SOLUTION INTRAMUSCULAR; INTRAVENOUS; SUBCUTANEOUS at 14:25

## 2017-09-15 RX ADMIN — HYDROMORPHONE HYDROCHLORIDE 0.5 MG: 2 INJECTION, SOLUTION INTRAMUSCULAR; INTRAVENOUS; SUBCUTANEOUS at 11:08

## 2017-09-15 RX ADMIN — HYDROMORPHONE HYDROCHLORIDE 0.5 MG: 2 INJECTION, SOLUTION INTRAMUSCULAR; INTRAVENOUS; SUBCUTANEOUS at 14:04

## 2017-09-15 RX ADMIN — ROCURONIUM BROMIDE 5 MG: 10 INJECTION INTRAVENOUS at 10:43

## 2017-09-15 RX ADMIN — FENTANYL CITRATE 100 MCG: 50 INJECTION INTRAMUSCULAR; INTRAVENOUS at 11:18

## 2017-09-15 RX ADMIN — FENTANYL CITRATE 50 MCG: 50 INJECTION INTRAMUSCULAR; INTRAVENOUS at 14:25

## 2017-09-15 NOTE — ANESTHESIA POSTPROCEDURE EVALUATION
Patient: Pretty Israel    Procedure Summary     Date Anesthesia Start Anesthesia Stop Room / Location    09/15/17 1036 1416  [facility] / [facility] MAIN OR       Procedure Diagnosis Surgeon Provider    MINIMINALLY INVASIVE SPINAL TRANSFORMINAL LUMBAR INTERBODY FUSION L5-S1 (N/A Spine Lumbar) No diagnosis on file. DO Lo Leyva MD          Anesthesia Type: general  Last vitals  BP   118/74 (09/15/17 1413)    Temp   37.3 °C (99.1 °F) (09/15/17 1413)    Pulse   106 (09/15/17 1413)   Resp   20 (09/15/17 1413)    SpO2   100 % (09/15/17 1413)      Post Anesthesia Care and Evaluation    Patient location during evaluation: PACU  Patient participation: complete - patient participated  Level of consciousness: awake and alert  Pain management: adequate  Airway patency: patent  Anesthetic complications: No anesthetic complications    Cardiovascular status: acceptable  Respiratory status: acceptable  Hydration status: acceptable    Comments: ---------------------------               09/15/17                      1413         ---------------------------   BP:          118/74         Pulse:         106          Resp:          20           Temp:   37.3 °C (99.1 °F)   SpO2:         100%         ---------------------------

## 2017-09-15 NOTE — CONSULTS
CONSULT NOTE    INTERNAL MEDICINE   [facility]       Patient Identification:  Name: Pretty Israel  Age: 32 y.o.  Sex: female  :  1985  MRN: [id number]             Date of Consultation:  9/15/2017        Primary Care Physician: Jonh Thornton MD                               Requesting Physician: Dr. Johns  Reason for Consultation: Management of tachycardia and numbness and rapid breathing after surgery    Chief Complaint:  Back apinPain    History of Present Illness:   Patient is 32-year-old female who is past medical history is remarkable for heart murmur since her childhood, history of 3 successful pregnancies, history of anxiety and depression in the past as well as chronic pain and otherwise no significant chronic medical issues was brought in for elective back surgery for underlying lumbar spinal stenosis.  Postoperatively patient was brought him to the floor and upon arrival she started complaining of numbness in her arms breathing heavily and palpitation.  She doesn't recall having these episodes before.  She said that her appetite is OK and she is hungry.  She denies any weakness of arm or legs.  She denies any numbness in the legs.  He admits that she has long history of rapid heart rate and her  heart rate usually runs in 100 to 110 when she is feeling well.      Past Medical History:  Past Medical History:   Diagnosis Date   • Depression with anxiety     SITUATION   • Genital herpes    • Heart murmur     BENIGN   • Injury of back    • Lumbar disc disease    • Tingling     LEFT LEG FROM BACK TO FOOT     Past Surgical History:  Past Surgical History:   Procedure Laterality Date   • ABDOMINAL SURGERY      BENIGN TUMOR REMOVED   • BREAST AUGMENTATION     • WISDOM TOOTH EXTRACTION        Home Meds:  Prescriptions Prior to Admission   Medication Sig Dispense Refill Last Dose   • cyclobenzaprine (FLEXERIL) 10 MG tablet Take 1 tablet by mouth 3 (Three) Times a Day As Needed  for Muscle Spasms. 42 tablet 0 9/14/2017 at 2330   • ibuprofen (ADVIL,MOTRIN) 200 MG tablet Take 800 mg by mouth Every 6 (Six) Hours As Needed for Mild Pain . WILL HOLD FOR SURGERY.   9/11/2017   • oxyCODONE-acetaminophen (PERCOCET) 7.5-325 MG per tablet Take 1 tablet by mouth 2 (Two) Times a Day As Needed for Moderate Pain .   9/14/2017 at 1900   • valACYclovir (VALTREX) 1000 MG tablet Take 1,000 mg by mouth 2 (Two) Times a Day As Needed (AS NEEDED FOR BREAKOUTS).   9/13/2017     Current Meds:     Current Facility-Administered Medications:   •  ALPRAZolam (XANAX) tablet 0.5 mg, 0.5 mg, Oral, TID PRN, Ángel Bro MD, 0.5 mg at 09/15/17 1922  •  bisacodyl (DULCOLAX) EC tablet 5 mg, 5 mg, Oral, Daily PRN, Ten Nat, DO  •  bisacodyl (DULCOLAX) suppository 10 mg, 10 mg, Rectal, Daily PRN, Ten Nat, DO  •  ceFAZolin in dextrose (ANCEF) IVPB solution 2 g, 2 g, Intravenous, Q8H, Ten Nat, DO, 2 g at 09/15/17 1922  •  cyclobenzaprine (FLEXERIL) tablet 10 mg, 10 mg, Oral, TID PRN, Ten Nat, DO  •  diphenhydrAMINE (BENADRYL) capsule 25 mg, 25 mg, Oral, Q6H PRN, Ten Nat, DO  •  docusate sodium (COLACE) capsule 100 mg, 100 mg, Oral, BID PRN, Ten Nat, DO  •  HYDROmorphone (DILAUDID) PCA 0.2 mg/ml 50 mL syringe, , Intravenous, Continuous, Ten Nat, DO  •  magnesium hydroxide (MILK OF MAGNESIA) suspension 2400 mg/10mL 10 mL, 10 mL, Oral, Daily PRN, Ten Nat, DO  •  naloxone (NARCAN) injection 0.1 mg, 0.1 mg, Intravenous, Q5 Min PRN, Ten Nat, DO  •  ondansetron (ZOFRAN) tablet 4 mg, 4 mg, Oral, Q6H PRN **OR** ondansetron ODT (ZOFRAN-ODT) disintegrating tablet 4 mg, 4 mg, Oral, Q6H PRN **OR** ondansetron (ZOFRAN) injection 4 mg, 4 mg, Intravenous, Q6H PRN, Ten Johns, DO  •  oxyCODONE-acetaminophen (PERCOCET) 7.5-325 MG per tablet 2 tablet, 2 tablet, Oral, Q4H PRN, Ten Johns, DO, 2 tablet at 09/15/17 4599  •  polyethylene glycol (MIRALAX) powder 17 g, 17 g, Oral, Daily PRN, Ten Johns, DO  •   "promethazine (PHENERGAN) tablet 12.5 mg, 12.5 mg, Oral, Q6H PRN **OR** promethazine (PHENERGAN) injection 12.5 mg, 12.5 mg, Intramuscular, Q6H PRN **OR** promethazine (PHENERGAN) suppository 12.5 mg, 12.5 mg, Rectal, Q6H PRN, Ten Nat, DO  •  sennosides-docusate sodium (SENOKOT-S) 8.6-50 MG tablet 1 tablet, 1 tablet, Oral, Nightly PRN, Ten Nat, DO  •  sodium chloride 0.45 % infusion, 100 mL/hr, Intravenous, Continuous, Ten Nat, DO  •  sodium chloride 0.9 % flush 1-10 mL, 1-10 mL, Intravenous, PRN, Ten Nat, DO  •  sodium chloride 0.9 % infusion, 125 mL/hr, Intravenous, Continuous, Jawed MD Andrés, Last Rate: 125 mL/hr at 09/15/17 1922, 125 mL/hr at 09/15/17 1922  •  valACYclovir (VALTREX) tablet 1,000 mg, 1,000 mg, Oral, BID PRN, Ten Nat, DO  Allergies:  No Known Allergies  Social History:   Social History   Substance Use Topics   • Smoking status: Former Smoker     Packs/day: 0.50     Types: Cigarettes   • Smokeless tobacco: Never Used      Comment: SOCIAL. NONE IN PAST 3 WEEKS   • Alcohol use Yes      Comment: every other week      Family History:  Family History   Problem Relation Age of Onset   • Malig Hyperthermia Neg Hx           Review of Systems  See history of present illness and past medical history.    Constitutional No fever or chills  Cardiovascular remarkable for known heart murmur and tachycardia no chest pain  GI: Remarkable for no nausea vomiting or diarrhea  : Remarkable for no burning in urination frequency urgency  Musculoskeletal: Remarkable for no specific joint aches and pain  Neurological: Remarkable for bilateral forearm numbness but no weakness of arm or legs.    Vitals:   /74 (BP Location: Right arm, Patient Position: Lying)  Pulse (!) 124  Temp 98.6 °F (37 °C) (Oral)   Resp 14  Ht 66\" (167.6 cm)  Wt 218 lb 3.2 oz (99 kg)  LMP 09/02/2017 Comment: negative pregnancy test 9/13  SpO2 99%  BMI 35.22 kg/m2  I/O:   Intake/Output Summary (Last 24 hours) at " 09/15/17 1922  Last data filed at 09/15/17 1355   Gross per 24 hour   Intake             1500 ml   Output              650 ml   Net              850 ml     Exam:  General Appearance:    Alert, cooperative, no distress, appears stated age   Head:    Normocephalic, without obvious abnormality, atraumatic   Eyes:    PERRL, conjunctiva/corneas clear, EOM's intact, both eyes   Ears:    Normal external ear canals, both ears   Nose:   Nares normal, septum midline, mucosa normal, no drainage    or sinus tenderness   Throat:   Lips, tongue, gums normal; oral mucosa pink and moist   Neck:   Supple, symmetrical, trachea midline, no adenopathy;     thyroid:  no enlargement/tenderness/nodules; no carotid    bruit or JVD   Back:     Back not examined as patient is laying supine after surgery and was complaining of pain due to movement    Lungs:     Clear to auscultation bilaterally, respirations unlabored   Chest Wall:    No tenderness or deformity    Heart:    Regular rate and rhythm, S1 and S2 normal, no murmur, rub   or gallop   Abdomen:     Soft, non-tender, bowel sounds active all four quadrants,     no masses, no hepatomegaly, no splenomegaly   Extremities:   Extremities normal, atraumatic, no cyanosis or edema   Pulses:   Pulses palpable in all extremities; symmetric all extremities   Skin:   Skin color normal, Skin is warm and dry,  no rashes or palpable lesions   Neurologic:   CNII-XII intact, motor strength grossly intact, sensation grossly intact to light touch, no focal deficits noted       Data Review:      I reviewed the patient's new clinical results.    Results from last 7 days  Lab Units 09/13/17  0914   WBC 10*3/mm3 7.33   HEMOGLOBIN g/dL 12.9   PLATELETS 10*3/mm3 280       Results from last 7 days  Lab Units 09/13/17  0914   SODIUM mmol/L 141   POTASSIUM mmol/L 4.7   CHLORIDE mmol/L 103   CO2 mmol/L 24.8   BUN mg/dL 10   CREATININE mg/dL 0.77   CALCIUM mg/dL 9.7   GLUCOSE mg/dL 111*       Assessment:  Active  Hospital Problems (** Indicates Principal Problem)    Diagnosis Date Noted   • **Stenosis, spinal, lumbar [M48.06] 09/15/2017   • Chronic tachycardia [R00.0] 09/15/2017   • Panic attack [F41.0] 09/15/2017      Resolved Hospital Problems    Diagnosis Date Noted Date Resolved   No resolved problems to display.       Plan:  Continue present care in terms of pain management DVT prophylaxis and mobility guidelines per spine surgery service.  Provide her with an xanax as  needed and IV fluid normal saline at 1 25 cc an hour for at least 2 L to catch up for possible underlying dehydration compounded by recent surgical stress.   Thank you Dr. Johns for letting us be the part of the patient care please see above impression and recommendation    Junior Bowen MD   9/15/2017  7:22 PM  Much of this encounter note is an electronic transcription/translation of spoken language to printed text. The electronic translation of spoken language may permit erroneous, or at times, nonsensical words or phrases to be inadvertently transcribed; Although I have reviewed the note for such errors, some may still exist

## 2017-09-15 NOTE — ANESTHESIA PROCEDURE NOTES
Airway  Urgency: elective    Airway not difficult    General Information and Staff    Patient location during procedure: OR  Anesthesiologist: BARTOLOME MARKHAM  CRNA: LEN YBARRA    Indications and Patient Condition  Indications for airway management: airway protection    Preoxygenated: yes  MILS maintained throughout  Mask difficulty assessment: 1 - vent by mask    Final Airway Details  Final airway type: endotracheal airway      Successful airway: ETT  Cuffed: yes   Successful intubation technique: direct laryngoscopy  Facilitating devices/methods: intubating stylet  Endotracheal tube insertion site: oral  Blade: Jordan  Blade size: #2  ETT size: 7.0 mm  Cormack-Lehane Classification: grade I - full view of glottis  Placement verified by: chest auscultation and capnometry   Cuff volume (mL): 8  Measured from: lips  ETT to lips (cm): 21  Number of attempts at approach: 1    Additional Comments  Pt preoxygenated, SIVI, bag mask vent, ATETI, dentition as before

## 2017-09-15 NOTE — PLAN OF CARE
Problem: Patient Care Overview (Adult)  Goal: Plan of Care Review  Outcome: Ongoing (interventions implemented as appropriate)    09/15/17 0736   Coping/Psychosocial Response Interventions        09/15/17 0736   Coping/Psychosocial Response Interventions   Plan Of Care Reviewed With patient   Patient Care Overview   Progress no change             Progress no change       Goal: Adult Individualization and Mutuality  Outcome: Ongoing (interventions implemented as appropriate)    09/15/17 0736   Individualization   Patient Specific Preferences none       Goal: Discharge Needs Assessment  Outcome: Ongoing (interventions implemented as appropriate)    09/15/17 0736   Discharge Needs Assessment   Concerns To Be Addressed no discharge needs identified         Problem: Perioperative Period (Adult)  Goal: Signs and Symptoms of Listed Potential Problems Will be Absent or Manageable (Perioperative Period)  Outcome: Ongoing (interventions implemented as appropriate)    09/15/17 0736   Perioperative Period   Problems Assessed (Perioperative Period) all   Problems Present (Perioperative Period) none

## 2017-09-15 NOTE — ANESTHESIA PREPROCEDURE EVALUATION
Anesthesia Evaluation     Patient summary reviewed   no history of anesthetic complications:  NPO Solid Status: > 8 hours  NPO Liquid Status: > 8 hours     Airway   Mallampati: I  TM distance: >3 FB  Neck ROM: full  no difficulty expected  Dental      Pulmonary     breath sounds clear to auscultation  (+) a smoker Former,   (-) shortness of breath  Cardiovascular   Exercise tolerance: good (4-7 METS)    Rhythm: regular  Rate: normal    (+) valvular problems/murmurs murmur,   (-) murmur    PE comment: Pt. Claims a childhood murmur that has never caused any issues. I did not hear this on my exam.     Neuro/Psych  (+) psychiatric history,    GI/Hepatic/Renal/Endo      Musculoskeletal     (+) back pain,   Abdominal    Substance History      OB/GYN          Other                                        Anesthesia Plan    ASA 2     general     intravenous induction   Anesthetic plan and risks discussed with patient, spouse/significant other and mother.

## 2017-09-15 NOTE — H&P
Patient Care Team:  Jonh Thornton MD as PCP - General (Internal Medicine)    Chief complaint lumbar pain    Subjective     Patient is a 32 y.o. female presents with lumbar pain with radiation into both legs. Onset of symptoms was years ago.       Review of Systems   Pertinent items are noted in HPI    History  Past Medical History:   Diagnosis Date   • Depression with anxiety     SITUATION   • Genital herpes    • Heart murmur     BENIGN   • Injury of back    • Lumbar disc disease    • Tingling     LEFT LEG FROM BACK TO FOOT     Past Surgical History:   Procedure Laterality Date   • ABDOMINAL SURGERY      BENIGN TUMOR REMOVED   • BREAST AUGMENTATION     • WISDOM TOOTH EXTRACTION       Family History   Problem Relation Age of Onset   • Malig Hyperthermia Neg Hx      Social History   Substance Use Topics   • Smoking status: Former Smoker     Packs/day: 0.50     Types: Cigarettes   • Smokeless tobacco: Never Used      Comment: SOCIAL. NONE IN PAST 3 WEEKS   • Alcohol use Yes      Comment: every other week     Prescriptions Prior to Admission   Medication Sig Dispense Refill Last Dose   • cyclobenzaprine (FLEXERIL) 10 MG tablet Take 1 tablet by mouth 3 (Three) Times a Day As Needed for Muscle Spasms. 42 tablet 0 9/14/2017 at 2330   • ibuprofen (ADVIL,MOTRIN) 200 MG tablet Take 800 mg by mouth Every 6 (Six) Hours As Needed for Mild Pain . WILL HOLD FOR SURGERY.   9/11/2017   • oxyCODONE-acetaminophen (PERCOCET) 7.5-325 MG per tablet Take 1 tablet by mouth 2 (Two) Times a Day As Needed for Moderate Pain .   9/14/2017 at 1900   • valACYclovir (VALTREX) 1000 MG tablet Take 1,000 mg by mouth 2 (Two) Times a Day As Needed (AS NEEDED FOR BREAKOUTS).   9/13/2017     Allergies:  Review of patient's allergies indicates no known allergies.    Objective     Vital Signs  Temp:  [98.2 °F (36.8 °C)] 98.2 °F (36.8 °C)  Heart Rate:  [92] 92  Resp:  [18] 18  BP: (110)/(76) 110/76    Physical Exam:        General Appearance:     Alert, cooperative, in no acute distress   Head:    Normocephalic, without obvious abnormality, atraumatic   Eyes:         Lids and lashes normal, conjunctivae and sclerae normal    Ears:    Ears appear intact with no abnormalities noted   Neck:   supple, trachea midline   Back:     No kyphosis present, no scoliosis present, no skin lesions,     erythema or scars, no tenderness to percussion or                  palpation, range of motion decreased   Lungs:     Respirations regular, even and unlabored    Heart:    Regular rhythm and normal rate   Abdomen:     Normal bowel sounds,soft non-tender, no guarding   Genitalia:    Deferred   Extremities:   Moves all extremities well, no edema, no cyanosis, no            redness   Pulses:   Pulses palpable and equal bilaterally   Skin:   No bleeding, bruising or rash   Neurologic:   4/5 left TA and EHL motor strength testing, otherwise bilateral LE motor strength grossly intact, sensation intact       Results Review:    None    Assessment/Plan     Active Problems:    Stenosis, spinal, lumbar      MIS TLIF L5-S1    I discussed the patients findings and my recommendations with patient, family and nursing staff.     Ten Johns DO  09/15/17  9:31 AM

## 2017-09-15 NOTE — PLAN OF CARE
Problem: Patient Care Overview (Adult)  Goal: Plan of Care Review  Outcome: Ongoing (interventions implemented as appropriate)    09/15/17 1818   Coping/Psychosocial Response Interventions   Plan Of Care Reviewed With patient   Patient Care Overview   Progress progress towards functional goals is fair   Outcome Evaluation   Outcome Summary/Follow up Plan Pt is very anxious about her post op and recovery          Problem: Pain, Acute (Adult)  Goal: Identify Related Risk Factors and Signs and Symptoms  Outcome: Ongoing (interventions implemented as appropriate)    09/15/17 1818   Pain, Acute   Related Risk Factors (Acute Pain) surgery;positioning   Signs and Symptoms (Acute Pain) fear of reinjury;verbalization of pain descriptors. Pt has PCA available, and oral medication that can be given. PT is is very anxious, was in PACU for several hours due to uncontrolled pain.        09/15/17 1818   Pain, Acute   Related Risk Factors (Acute Pain) surgery;positioning   Signs and Symptoms (Acute Pain) fear of reinjury;verbalization of pain descriptors           Goal: Acceptable Pain Control/Comfort Level  Outcome: Ongoing (interventions implemented as appropriate)    09/15/17 1818   Pain, Acute (Adult)   Acceptable Pain Control/Comfort Level making progress toward outcome         Problem: Fall Risk (Adult)  Goal: Identify Related Risk Factors and Signs and Symptoms  Outcome: Ongoing (interventions implemented as appropriate)    09/15/17 1818   Fall Risk   Fall Risk: Related Risk Factors depression/anxiety;history of falls;gait/mobility problems;fear of falling;sensory deficits   Fall Risk: Signs and Symptoms presence of risk factors. Continue falls precautions       Goal: Absence of Falls  Outcome: Ongoing (interventions implemented as appropriate)    09/15/17 1818   Fall Risk (Adult)   Absence of Falls making progress toward outcome

## 2017-09-16 PROBLEM — M48.061 STENOSIS, SPINAL, LUMBAR: Status: RESOLVED | Noted: 2017-09-15 | Resolved: 2017-09-16

## 2017-09-16 PROBLEM — F41.0 PANIC ATTACK: Status: RESOLVED | Noted: 2017-09-15 | Resolved: 2017-09-16

## 2017-09-16 LAB
ANION GAP SERPL CALCULATED.3IONS-SCNC: 17.4 MMOL/L
BUN BLD-MCNC: 6 MG/DL (ref 6–20)
BUN/CREAT SERPL: 8 (ref 7–25)
CALCIUM SPEC-SCNC: 8.5 MG/DL (ref 8.6–10.5)
CHLORIDE SERPL-SCNC: 105 MMOL/L (ref 98–107)
CO2 SERPL-SCNC: 20.6 MMOL/L (ref 22–29)
CREAT BLD-MCNC: 0.75 MG/DL (ref 0.57–1)
GFR SERPL CREATININE-BSD FRML MDRD: 88 ML/MIN/1.73
GLUCOSE BLD-MCNC: 161 MG/DL (ref 65–99)
HCT VFR BLD AUTO: 30.1 % (ref 35.6–45.5)
HGB BLD-MCNC: 9.7 G/DL (ref 11.9–15.5)
POTASSIUM BLD-SCNC: 4.1 MMOL/L (ref 3.5–5.2)
SODIUM BLD-SCNC: 143 MMOL/L (ref 136–145)

## 2017-09-16 PROCEDURE — 97110 THERAPEUTIC EXERCISES: CPT

## 2017-09-16 PROCEDURE — 25010000003 CEFAZOLIN IN DEXTROSE 2-4 GM/100ML-% SOLUTION: Performed by: ORTHOPAEDIC SURGERY

## 2017-09-16 PROCEDURE — 85018 HEMOGLOBIN: CPT | Performed by: ORTHOPAEDIC SURGERY

## 2017-09-16 PROCEDURE — 97161 PT EVAL LOW COMPLEX 20 MIN: CPT

## 2017-09-16 PROCEDURE — 85014 HEMATOCRIT: CPT | Performed by: ORTHOPAEDIC SURGERY

## 2017-09-16 PROCEDURE — 80048 BASIC METABOLIC PNL TOTAL CA: CPT | Performed by: ORTHOPAEDIC SURGERY

## 2017-09-16 RX ORDER — CYCLOBENZAPRINE HCL 10 MG
10 TABLET ORAL 3 TIMES DAILY PRN
Qty: 42 TABLET | Refills: 0 | Status: SHIPPED | OUTPATIENT
Start: 2017-09-16 | End: 2017-09-30

## 2017-09-16 RX ORDER — OXYCODONE AND ACETAMINOPHEN 7.5; 325 MG/1; MG/1
1 TABLET ORAL EVERY 6 HOURS PRN
Qty: 70 TABLET | Refills: 0 | Status: SHIPPED | OUTPATIENT
Start: 2017-09-16 | End: 2017-09-30

## 2017-09-16 RX ORDER — SODIUM CHLORIDE 9 MG/ML
75 INJECTION, SOLUTION INTRAVENOUS CONTINUOUS
Status: DISCONTINUED | OUTPATIENT
Start: 2017-09-16 | End: 2017-09-18 | Stop reason: HOSPADM

## 2017-09-16 RX ADMIN — OXYCODONE HYDROCHLORIDE AND ACETAMINOPHEN 2 TABLET: 7.5; 325 TABLET ORAL at 18:29

## 2017-09-16 RX ADMIN — CYCLOBENZAPRINE HYDROCHLORIDE 10 MG: 10 TABLET, FILM COATED ORAL at 21:44

## 2017-09-16 RX ADMIN — CYCLOBENZAPRINE HYDROCHLORIDE 10 MG: 10 TABLET, FILM COATED ORAL at 13:47

## 2017-09-16 RX ADMIN — OXYCODONE HYDROCHLORIDE AND ACETAMINOPHEN 2 TABLET: 7.5; 325 TABLET ORAL at 10:29

## 2017-09-16 RX ADMIN — ALPRAZOLAM 0.5 MG: 0.5 TABLET ORAL at 23:02

## 2017-09-16 RX ADMIN — OXYCODONE HYDROCHLORIDE AND ACETAMINOPHEN 2 TABLET: 7.5; 325 TABLET ORAL at 02:26

## 2017-09-16 RX ADMIN — OXYCODONE HYDROCHLORIDE AND ACETAMINOPHEN 1 TABLET: 7.5; 325 TABLET ORAL at 14:44

## 2017-09-16 RX ADMIN — ALPRAZOLAM 0.5 MG: 0.5 TABLET ORAL at 02:26

## 2017-09-16 RX ADMIN — CYCLOBENZAPRINE HYDROCHLORIDE 10 MG: 10 TABLET, FILM COATED ORAL at 03:54

## 2017-09-16 RX ADMIN — OXYCODONE HYDROCHLORIDE AND ACETAMINOPHEN 2 TABLET: 7.5; 325 TABLET ORAL at 06:48

## 2017-09-16 RX ADMIN — CEFAZOLIN SODIUM 2 G: 2 INJECTION, SOLUTION INTRAVENOUS at 02:27

## 2017-09-16 RX ADMIN — CEFAZOLIN SODIUM 2 G: 2 INJECTION, SOLUTION INTRAVENOUS at 10:29

## 2017-09-16 RX ADMIN — SODIUM CHLORIDE 75 ML/HR: 9 INJECTION, SOLUTION INTRAVENOUS at 17:41

## 2017-09-16 RX ADMIN — ALPRAZOLAM 0.5 MG: 0.5 TABLET ORAL at 14:52

## 2017-09-16 NOTE — THERAPY EVALUATION
Acute Care - Physical Therapy Initial Evaluation  [facility]     Patient Name: Pretty Israel  : 1985  MRN: [id number]  Today's Date: 2017      Date of Referral to PT: 09/15/17         Admit Date: 9/15/2017     Visit Dx:  No diagnosis found.  Patient Active Problem List   Diagnosis   • Acute midline low back pain with left-sided sciatica   • Tobacco abuse   • Chronic tachycardia     Past Medical History:   Diagnosis Date   • Depression with anxiety     SITUATION   • Genital herpes    • Heart murmur     BENIGN   • Injury of back    • Lumbar disc disease    • Tingling     LEFT LEG FROM BACK TO FOOT     Past Surgical History:   Procedure Laterality Date   • ABDOMINAL SURGERY      BENIGN TUMOR REMOVED   • BREAST AUGMENTATION     • WISDOM TOOTH EXTRACTION            PT ASSESSMENT (last 72 hours)      PT Evaluation       17 1404 17 0845    Rehab Evaluation    Document Type evaluation  -AL     Subjective Information agree to therapy;complains of;pain  -AL     Patient Effort, Rehab Treatment good  -AL     Symptoms Noted During/After Treatment increased pain  -AL     General Information    Prior Level of Function independent:  -AL     Equipment Currently Used at Home none  -AL     Plans/Goals Discussed With patient and family  -AL     Risks Reviewed patient and family:  -AL     Benefits Reviewed patient and family:  -AL     Barriers to Rehab none identified  -AL     Living Environment    Lives With child(osmani), dependent  -AL     Living Arrangements house  -AL     Clinical Impression    Date of Referral to PT 09/15/17  -AL     PT Diagnosis generalized strength and difficulty with ambulation  -AL     Prognosis good  -AL     Functional Level At Time Of Evaluation moderate assistance  -AL     Rehab Potential good, to achieve stated therapy goals  -AL     Pain Assessment    Pain Assessment 0-10  -AL     Pain Score 7  -AL     Pain Type Surgical pain  -AL     Pain Location Back  -AL     Pain  Orientation Lower  -AL     Vision Assessment/Intervention    Visual Impairment WNL  -AL     Cognitive Assessment/Intervention    Current Cognitive/Communication Assessment functional  -AL     Orientation Status oriented x 4  -AL     Follows Commands/Answers Questions 100% of the time  -AL     Personal Safety WNL/WFL  -AL     Personal Safety Interventions nonskid shoes/slippers when out of bed  -AL     Short/Long Term Memory intact short term memory;intact long term memory  -AL     ROM (Range of Motion)    General ROM no range of motion deficits identified  -AL     MMT (Manual Muscle Testing)    General MMT Assessment --   generalized LE weakness  -AL     Muscle Tone Assessment    Muscle Tone Assessment  Bilateral Lower Extremities  -EL    Bilateral Lower Extremities Muscle Tone Assessment  mildly decreased tone  -EL    Bed Mobility, Assessment/Treatment    Bed Mob, Supine to Sit, Davis not tested   sitting EOB  -AL     Bed Mob, Sit to Supine, Davis moderate assist (50% patient effort)  -AL     Transfer Assessment/Treatment    Transfers, Sit-Stand Davis contact guard assist;verbal cues required;nonverbal cues required (demo/gesture)  -AL     Transfers, Stand-Sit Davis contact guard assist;verbal cues required;nonverbal cues required (demo/gesture)  -AL     Transfers, Sit-Stand-Sit, Assist Device rolling walker  -AL     Gait Assessment/Treatment    Gait, Davis Level minimum assist (75% patient effort);verbal cues required;nonverbal cues required (demo/gesture)  -AL     Gait, Assistive Device rolling walker  -AL     Gait, Distance (Feet) 45  -AL     Gait, Gait Deviations shawanda decreased;step length decreased;stride length decreased  -AL     Gait, Maintain Weight Bearing Status able to maintain weight bearing status  -AL     Gait, Safety Issues step length decreased  -AL     Gait, Impairments strength decreased  -AL     Positioning and Restraints    Pre-Treatment Position in bed    sitting EOB  -AL     Post Treatment Position bed  -AL     In Bed notified nsg;supine;call light within reach;with family/caregiver;with nsg  -AL       09/15/17 1922 09/15/17 0817    General Information    Equipment Currently Used at Home  none  -    Living Environment    Lives With  child(osmani), dependent;significant other  -    Living Arrangements  house  -    Home Accessibility  no concerns  -    Stair Railings at Home  none  -    Transportation Available  car  -    Muscle Tone Assessment    Muscle Tone Assessment Bilateral Lower Extremities  -EB     Bilateral Lower Extremities Muscle Tone Assessment mildly decreased tone  -EB       User Key  (r) = Recorded By, (t) = Taken By, (c) = Cosigned By    Initials Name Provider Type    SALONI Rousseau, PT Physical Therapist    ONEYDA Fox, RN Registered Nurse    CARSON Rosenberg, RN Registered Nurse    PM Glaser RN Registered Nurse          Physical Therapy Education     Title: PT OT SLP Therapies (Done)     Topic: Physical Therapy (Done)     Point: Mobility training (Done)    Learning Progress Summary    Learner Readiness Method Response Comment Documented by Status   Patient Acceptance E [facility] 09/16/17 1456 Done               Point: Home exercise program (Done)    Learning Progress Summary    Learner Readiness Method Response Comment Documented by Status   Patient Acceptance E [facility] 09/16/17 1456 Done               Point: Body mechanics (Done)    Learning Progress Summary    Learner Readiness Method Response Comment Documented by Status   Patient Acceptance E [facility] 09/16/17 1456 Done               Point: Precautions (Done)    Learning Progress Summary    Learner Readiness Method Response Comment Documented by Status   Patient Acceptance E [facility] 09/16/17 1456 Done                      User Key     Initials Effective Dates Name Provider Type Count includes the [facility] 12/01/15 -  Freida Rousseau, PT Physical Therapist PT                PT  Recommendation and Plan  Anticipated Equipment Needs At Discharge: two wheeled walker  Anticipated Discharge Disposition: inpatient rehabilitation facility, home with home health  PT Frequency: daily  Plan of Care Review  Plan Of Care Reviewed With: patient  Outcome Summary/Follow up Plan: Patient is appropriate for skilled PT secondary to decreased LE strength and imapired gait. Recommend Rehab after D/C from hospital to help strengthen and get back on her feet to take care of her children. Will continue to see patient while in this facility.          IP PT Goals       09/16/17 1458          Bed Mobility PT LTG    Bed Mobility PT LTG, Date Established 09/16/17  -AL      Bed Mobility PT LTG, Time to Achieve 1 wk  -AL      Bed Mobility PT LTG, Activity Type all bed mobility  -AL      Bed Mobility PT LTG, [place] Level supervision required  -AL      Transfer Training PT LTG    Transfer Training PT LTG, Date Established 09/16/17  -AL      Transfer Training PT LTG, Time to Achieve 1 wk  -AL      Transfer Training PT LTG, Activity Type bed to chair /chair to bed;sit to stand/stand to sit  -AL      Transfer Training PT LTG, [place] Level contact guard assist  -AL      Transfer Training PT LTG, Assist Device walker, rolling  -AL      Gait Training PT LTG    Gait Training Goal PT LTG, Date Established 09/16/17  -AL      Gait Training Goal PT LTG, Time to Achieve 1 wk  -AL      Gait Training Goal PT LTG, [place] Level contact guard assist  -AL      Gait Training Goal PT LTG, Assist Device walker, rolling  -AL      Gait Training Goal PT LTG, Distance to Achieve 100  -AL        User Key  (r) = Recorded By, (t) = Taken By, (c) = Cosigned By    Initials Name Provider Type    SALONI Rousseau, PT Physical Therapist                Outcome Measures       09/16/17 1400          How much help from another person do you currently need...    Turning from your back to your side while in flat bed without using bedrails? 2   -AL      Moving from lying on back to sitting on the side of a flat bed without bedrails? 2  -AL      Moving to and from a bed to a chair (including a wheelchair)? 3  -AL      Standing up from a chair using your arms (e.g., wheelchair, bedside chair)? 3  -AL      Climbing 3-5 steps with a railing? 2  -AL      To walk in hospital room? 3  -AL      AM-PAC 6 Clicks Score 15  -AL      Functional Assessment    Outcome Measure Options AM-PAC 6 Clicks Basic Mobility (PT)  -AL        User Key  (r) = Recorded By, (t) = Taken By, (c) = Cosigned By    Initials Name Provider Type    SALONI Rousseau, PT Physical Therapist           Time Calculation:         PT Charges       09/16/17 1500          Time Calculation    Start Time 1404  -AL      Stop Time 1446  -AL      Time Calculation (min) 42 min  -AL      PT Received On 09/16/17  -AL      PT - Next Appointment 09/17/17  -AL      PT Goal Re-Cert Due Date 09/23/17  -AL        User Key  (r) = Recorded By, (t) = Taken By, (c) = Cosigned By    Initials Name Provider Type    SALONI Rousseau, PT Physical Therapist          Therapy Charges for Today     Code Description Service Date Service Provider Modifiers Qty    83520784766 HC PT EVAL LOW COMPLEXITY 2 9/16/2017 Freida Rousseau, PT GP 1    39161232617 HC PT THER PROC EA 15 MIN 9/16/2017 Freida Rousseau, PT GP 3          PT G-Codes  Outcome Measure Options: AM-PAC 6 Clicks Basic Mobility (PT)      Freida Rousseau, PT  9/16/2017

## 2017-09-16 NOTE — PLAN OF CARE
Problem: Patient Care Overview (Adult)  Goal: Plan of Care Review  Outcome: Ongoing (interventions implemented as appropriate)    09/16/17 0542   Coping/Psychosocial Response Interventions   Plan Of Care Reviewed With patient   Patient Care Overview   Progress no change   Outcome Evaluation   Outcome Summary/Follow up Plan Patient new surgery patient from [facility]. restless and anxious ther entire night. pain uncontrolled with every medication. staying at 8-10 on scale. refused to ambulate, can barely reposition in bed. low grade fever and pulse tachy. will continue to monitor

## 2017-09-16 NOTE — OP NOTE
PREOPERATIVE DIAGNOSES:   1.  Congenital spondylolisthesis L5-S1  2. Lumbar spinal stenosis  L5-S1  3.  Left lumbar radiculopathy.     POSTOPERATIVE DIAGNOSES:  Same    PROCEDURES PERFORMED:  1. Anterior lumbar arthrodesis via transforaminal approach, including removal of the disk for decompression of the spinal nerve roots  [id number]  2. Nonsegmental spinal instrumentation  L5-S1  3. Application of intervertebral biomechanical device [id number]  4.  Removal of the Avila fragment  5.  Lumbar laminectomy, facetectomy, foraminotomy for decompression of the bilateral exiting L5 nerve roots.  6.. Bone marrow aspirate from iliac crest.   7. Local autograft bone.   SURGEON: Ten Johns DO  ASSISTANT: More Urena PA-C Please note I utilized the services of an assistant, specifically, More Urena PA-C.  More participated in crucial portions of the operation.  The use of More greatly reduced overall operative time thereby reducing overall morbidity for the patient.    ANESTHESIA: General.   ESTIMATED BLOOD LOSS: 150 cc  IMPLANTS: Alphatec Spine.   COMPLICATIONS: None apparent.   DISPOSITION: Patient to recovery room in stable condition.   INDICATIONS: The patient is a 32 y.o. year old who has been having longstanding left leg pain coming from the back. The patient had imaging showing severe lateral recess stenosis associated with Congenital Spondylolithesis at L5-S1 and after failure of conservative treatment, I recommended minimally invasive TLIF L5-S1.  I explained risks of the surgery including but not limited to bleeding, infection, risk of anesthesia, blood clots, pulmonary embolus, myocardial infarction, stroke, nerve root damage causing complete or partial paralysis, dural tear causing spinal headache, risk of nonunion, risk of hardware complications, need for further surgery, lack of a guarantee, continued pain, and continued numbness. The patient had many questions about these risks, all of  which were answered to the best of my ability in a language which she could understand. Informed consent obtained. The patient presents today for minimally invasive TLIF L5-S1.   .   DESCRIPTION OF PROCEDURE: After identifying the patient in the preoperative holding area, all labs and consent were found to be in order. DANII hose and SCDs were applied for thromboembolic prophylaxis. The patient's back was marked with an indelible marker and she was transferred to the operative suite. In the OR, she was given the benefit of general anesthesia. The patient had neuromonitoring leads and Norton catheter placed. The patient was then carefully positioned prone on a Luis table, with all bony prominences padded. Kefzol 2 g was given prior to incision. Next after time-out was performed, I localized the L5-S1 interspace with fluoroscopy; making markings on the Ioban. I then started on the right-hand side with a 4 cm incision with a #10 blade. I then dissected with the Bovie to the fascia which I incised in line with the incision. I then placed my ILLICO dilators and placed 70 mm blades and affixed the retractor to the table. I then removed overlying muscle to expose the facet joint of as well as the transverse processes, and I took an x-ray confirming position. I then performed a far lateral extraforaminal decompression of the L5  nerve root on this side.  There is severe compression of the nerve from a combination of the facet joint as well as the scar tissue from the pars defect.  I carefully used a Kerrison punch to remove and completely exposed the L5 nerve exiting towards the lumbar plexus.  I also performed a laminectomy and removal of the Avila fragment.  I did so by use of osteotome, Kerrison's, and curette to release the transverse ligament. I identified the L5  nerve root in the far lateral zone, decompressed it here, and then followed in foraminal zone, where it was severely compressed due to foraminal stenosis.  Once this was decompressed, the L5  nerve was followed into the epidural space.  Next I identified Kambin's triangle and had More retracted the L5 and S1 nerve roots I could and should the very collapsed disc space and carefully prepared for interbody fusion.  I used elida and curettes as well as dilators to elevated from 1 mm to 9 mm.  I placed autograft bone and a 9 mm Concorde spacer and impacted this into the disc space confirming location with x-ray.  I then placed pedicle screws using the following technique. First I used an awl followed by Steffee, followed by tapping, followed by placement of 6.5 mm pedicle screws. They looked good on x-ray. They stimulated fine. I placed a kojo and placed set screw provisionally and S1 for preparation of reduction of L5 at the end of the case.. I then aspirated bone marrow from the iliac crest from several sites to optimize stem cell viability and mixed this with allograft, including Vivigen allograft chips and Progenix demineralized bone matrix. I then proceeded to the left- hand side, where her pain was predominent and performed the same incision to the spine with exposure and the retractor. At this time brought in the operating microscope. After removing muscle and exposing the L5-S1 facet joint, I performed decompression of theL5   nerve from a far lateral approach, identifying the nerve root in the extraforaminal zone, going towards the lumbar plexus, decompressing here and then using rongeur, osteotomes, and a drill to further decompress the very stenotic lateral recess. I then entered the epidural space, where it was also very severely stenotic especially the lateral recess due to overgrowth of the facet joint. This was carefully decompressed with a Kerrison punch.  There was again severe compression due to the pars defect as well as the compression from the facet joint aggravated by the anterolisthesis.  I then decompressed the midline spinal canal including  removal of the Avila fragment with Kerrison punch. Once the thecal sac and the L5   nerve root were completely decompressed, I identified Kambins triangle, performed diskectomy in this interval, prepared the disk space for fusion with use of elida and curettes. I then trialed and filled the disk space with bone which included autograft and allograft bone mixed with the bone marrow aspirate, followed by placement of an 9 mm PEEK spacer filled with autograft. I took an x-ray showing good position of the spacer. I then completed the instrumentation by use of pedicle screws at L5-S1, using the technique described earlier. They stimulated fine with neuromonitoring.  Next I performed bilateral reduction of the L5 spondylolisthesis pulling it back over 90%.  I placed set screws and L5 bilaterally and performed compression and final tightening.  I then took final x-rays, showing good position of the hardware and more importantly reduction of the spondylolisthesis and good restoration of the disc height.. I then irrigated out both wounds with copious amounts of normal saline. Gelfoam was placed over any exposed nerve roots and then the rest of the bone graft was placed over decorticated bone for posterolateral fusion. I then closed both wounds in a layered fashion and placed sterile dressings to the wound. The patient was then awakened from general anesthesia, transferred to his hospital bed, and taken to recovery room in stable condition.     DISPOSITION: The patient will be admitted to a spine bed, where she will be given pain medication, antibiotic prophylaxis, DVT prophylaxis, and physical therapy orders. We will anticipate a 1-2 night hospital stay, followed by discharge to home.

## 2017-09-16 NOTE — PLAN OF CARE
Problem: Inpatient Physical Therapy  Goal: Bed Mobility Goal LTG- PT  Outcome: Ongoing (interventions implemented as appropriate)    09/16/17 1458   Bed Mobility PT LTG   Bed Mobility PT LTG, Date Established 09/16/17   Bed Mobility PT LTG, Time to Achieve 1 wk   Bed Mobility PT LTG, Activity Type all bed mobility   Bed Mobility PT LTG, [place] Level supervision required       Goal: Transfer Training Goal 1 LTG- PT  Outcome: Ongoing (interventions implemented as appropriate)    09/16/17 1458   Transfer Training PT LTG   Transfer Training PT LTG, Date Established 09/16/17   Transfer Training PT LTG, Time to Achieve 1 wk   Transfer Training PT LTG, Activity Type bed to chair /chair to bed;sit to stand/stand to sit   Transfer Training PT LTG, [place] Level contact guard assist   Transfer Training PT LTG, Assist Device walker, rolling       Goal: Gait Training Goal LTG- PT  Outcome: Ongoing (interventions implemented as appropriate)    09/16/17 1458   Gait Training PT LTG   Gait Training Goal PT LTG, Date Established 09/16/17   Gait Training Goal PT LTG, Time to Achieve 1 wk   Gait Training Goal PT LTG, [place] Level contact guard assist   Gait Training Goal PT LTG, Assist Device walker, rolling   Gait Training Goal PT LTG, Distance to Achieve 100

## 2017-09-16 NOTE — PLAN OF CARE
Problem: Patient Care Overview (Adult)  Goal: Plan of Care Review  Outcome: Ongoing (interventions implemented as appropriate)    09/16/17 5967   Coping/Psychosocial Response Interventions   Plan Of Care Reviewed With patient   Outcome Evaluation   Outcome Summary/Follow up Plan Patient is appropriate for skilled PT secondary to decreased LE strength and imapired gait. Recommend Rehab after D/C from hospital to help strengthen and get back on her feet to take care of her children. Will continue to see patient while in this facility.

## 2017-09-16 NOTE — PLAN OF CARE
Problem: Patient Care Overview (Adult)  Goal: Plan of Care Review  Outcome: Ongoing (interventions implemented as appropriate)    09/16/17 1456 09/16/17 1557   Coping/Psychosocial Response Interventions   Plan Of Care Reviewed With patient --    Patient Care Overview   Progress --  progress toward functional goals is gradual   Outcome Evaluation   Outcome Summary/Follow up Plan --  PT pain controlled with PRN medications. vital signs stable. no c/O nausea or vomiting. dressing clean, dry and intact. PT up to chair with brace for all meals.         Problem: Pain, Acute (Adult)  Goal: Identify Related Risk Factors and Signs and Symptoms  Outcome: Ongoing (interventions implemented as appropriate)    09/15/17 1818   Pain, Acute   Related Risk Factors (Acute Pain) surgery;positioning   Signs and Symptoms (Acute Pain) fear of reinjury;verbalization of pain descriptors       Goal: Acceptable Pain Control/Comfort Level  Outcome: Ongoing (interventions implemented as appropriate)    09/15/17 1818   Pain, Acute (Adult)   Acceptable Pain Control/Comfort Level making progress toward outcome         Problem: Fall Risk (Adult)  Goal: Identify Related Risk Factors and Signs and Symptoms  Outcome: Ongoing (interventions implemented as appropriate)    09/15/17 1818   Fall Risk   Fall Risk: Related Risk Factors depression/anxiety;history of falls;gait/mobility problems;fear of falling;sensory deficits   Fall Risk: Signs and Symptoms presence of risk factors       Goal: Absence of Falls  Outcome: Ongoing (interventions implemented as appropriate)    09/16/17 1557   Fall Risk (Adult)   Absence of Falls making progress toward outcome

## 2017-09-16 NOTE — PROGRESS NOTES
Orthopedic Spine Progress Note    Subjective     Post-Operative Day: 1 post-spine procedure MINIMINALLY INVASIVE SPINAL TRANSFORMINAL LUMBER INTERBODY FUSION L5-S1  Systemic or Specific Complaints: Complains of low back pressure.  She denies any leg pain.    Objective     Vital signs in last 24 hours:  Temp:  [97.9 °F (36.6 °C)-99.7 °F (37.6 °C)] 97.9 °F (36.6 °C)  Heart Rate:  [] 105  Resp:  [10-20] 17  BP: ()/(56-92) 110/65    General: alert, appears stated age and cooperative   Neurovascular: stable   Wound: Wound clean and dry no evidence of infection.   Range of Motion: limited   DVT Exam: No evidence of DVT seen on physical exam.     Data Review  CBC:  Results from last 7 days  Lab Units 09/16/17  0446 09/13/17  0914   WBC 10*3/mm3  --  7.33   RBC 10*6/mm3  --  4.57   HEMOGLOBIN g/dL 9.7* 12.9   HEMATOCRIT % 30.1* 39.4   PLATELETS 10*3/mm3  --  280       Assessment/Plan     Status post-spine procedure: Doing well postoperatively.     Pain Relief: some relief    Continues current post-op course  Up with physical therapy  Possible discharge tomorrow    Activity: out of bed and ambulate    Weight Bearing: FWB     LOS: 1 day     Ten Johns DO    Date: 9/16/2017

## 2017-09-16 NOTE — PROGRESS NOTES
"   LOS: 1 day   Patient Care Team:  Jonh Thornton MD as PCP - General (Internal Medicine)    Chief Complaint: tired, pain    Subjective     HPI Comments: Feeling okay today. Lots of low back pain--pretty anxious about it. No chest pain, palp, or SOA. No N/V. Tolerating diet.      Subjective:  Symptoms:  Stable.  She reports anxiety.  No shortness of breath, malaise, cough, chest pain, weakness, headache, chest pressure, anorexia or diarrhea.    Diet:  Adequate intake.  No nausea or vomiting.    Activity level: Impaired due to pain.    Pain:  She complains of pain that is moderate.  She reports pain is improving.  Pain is well controlled.        History taken from: patient chart family    Objective     Vital Signs  Temp:  [97.9 °F (36.6 °C)-99.7 °F (37.6 °C)] 97.9 °F (36.6 °C)  Heart Rate:  [] 98  Resp:  [10-20] 17  BP: ()/(56-92) 116/85    Objective:  General Appearance:  Comfortable and in no acute distress.    Vital signs: (most recent): Blood pressure 116/85, pulse 98, temperature 97.9 °F (36.6 °C), temperature source Oral, resp. rate 17, height 66\" (167.6 cm), weight 218 lb 3.2 oz (99 kg), last menstrual period 09/02/2017, SpO2 100 %.  Vital signs are normal.  No fever.  (Tachycardic).    Output: Producing urine and no stool output.    HEENT: Normal HEENT exam.    Lungs:  Normal respiratory rate and normal effort.  Breath sounds clear to auscultation.    Heart: Tachycardia.  Regular rhythm.  No murmur.   Abdomen: Abdomen is soft.  Bowel sounds are normal.   There is no abdominal tenderness.     Extremities: There is no dependent edema.    Pulses: Distal pulses are intact.    Neurological: Patient is alert and oriented to person, place and time.    Pupils:  Pupils are equal, round, and reactive to light.    Skin:  Warm and dry.              Results Review:     I reviewed the patient's new clinical results.  I reviewed the patient's other test results and agree with the interpretation  I " personally viewed and interpreted the patient's EKG/Telemetry data  Discussed with patient and family x 2    Medication Review: reviewed    Assessment/Plan     Active Problems:    Chronic tachycardia      Assessment:  (1. Panic attack  2. Sinus tachycardia (chronic)  3. Acute blood loss anemia, post-op (expected)  4. Chronic pain syndrome  5. Lumbar stenosis, POD #1 s/p lumbar decompression  ).     Plan:   (Seems very stable at present  Certainly need to follow Hgb closely as anemia could contribute to her anxiety and tachycardia  Decrease IVFs  ).       Bladimir Esparza MD  09/16/17  3:18 PM    Time: 25min

## 2017-09-17 LAB
ANION GAP SERPL CALCULATED.3IONS-SCNC: 10.7 MMOL/L
BUN BLD-MCNC: 10 MG/DL (ref 6–20)
BUN/CREAT SERPL: 14.7 (ref 7–25)
CALCIUM SPEC-SCNC: 8.5 MG/DL (ref 8.6–10.5)
CHLORIDE SERPL-SCNC: 104 MMOL/L (ref 98–107)
CO2 SERPL-SCNC: 26.3 MMOL/L (ref 22–29)
CREAT BLD-MCNC: 0.68 MG/DL (ref 0.57–1)
DEPRECATED RDW RBC AUTO: 43.2 FL (ref 37–54)
ERYTHROCYTE [DISTWIDTH] IN BLOOD BY AUTOMATED COUNT: 13.7 % (ref 11.7–13)
GFR SERPL CREATININE-BSD FRML MDRD: 99 ML/MIN/1.73
GLUCOSE BLD-MCNC: 99 MG/DL (ref 65–99)
HCT VFR BLD AUTO: 27.5 % (ref 35.6–45.5)
HGB BLD-MCNC: 8.8 G/DL (ref 11.9–15.5)
MCH RBC QN AUTO: 27.9 PG (ref 26.9–32)
MCHC RBC AUTO-ENTMCNC: 32 G/DL (ref 32.4–36.3)
MCV RBC AUTO: 87.3 FL (ref 80.5–98.2)
PLATELET # BLD AUTO: 198 10*3/MM3 (ref 140–500)
PMV BLD AUTO: 9.8 FL (ref 6–12)
POTASSIUM BLD-SCNC: 3.7 MMOL/L (ref 3.5–5.2)
RBC # BLD AUTO: 3.15 10*6/MM3 (ref 3.9–5.2)
SODIUM BLD-SCNC: 141 MMOL/L (ref 136–145)
WBC NRBC COR # BLD: 5.96 10*3/MM3 (ref 4.5–10.7)

## 2017-09-17 PROCEDURE — 80048 BASIC METABOLIC PNL TOTAL CA: CPT | Performed by: HOSPITALIST

## 2017-09-17 PROCEDURE — 97110 THERAPEUTIC EXERCISES: CPT

## 2017-09-17 PROCEDURE — 85027 COMPLETE CBC AUTOMATED: CPT | Performed by: HOSPITALIST

## 2017-09-17 RX ADMIN — OXYCODONE HYDROCHLORIDE AND ACETAMINOPHEN 2 TABLET: 7.5; 325 TABLET ORAL at 00:27

## 2017-09-17 RX ADMIN — CYCLOBENZAPRINE HYDROCHLORIDE 10 MG: 10 TABLET, FILM COATED ORAL at 21:37

## 2017-09-17 RX ADMIN — CYCLOBENZAPRINE HYDROCHLORIDE 10 MG: 10 TABLET, FILM COATED ORAL at 10:37

## 2017-09-17 RX ADMIN — OXYCODONE HYDROCHLORIDE AND ACETAMINOPHEN 2 TABLET: 7.5; 325 TABLET ORAL at 17:30

## 2017-09-17 RX ADMIN — CYCLOBENZAPRINE HYDROCHLORIDE 10 MG: 10 TABLET, FILM COATED ORAL at 05:44

## 2017-09-17 RX ADMIN — OXYCODONE HYDROCHLORIDE AND ACETAMINOPHEN 2 TABLET: 7.5; 325 TABLET ORAL at 03:57

## 2017-09-17 RX ADMIN — ALPRAZOLAM 0.5 MG: 0.5 TABLET ORAL at 23:35

## 2017-09-17 RX ADMIN — OXYCODONE HYDROCHLORIDE AND ACETAMINOPHEN 2 TABLET: 7.5; 325 TABLET ORAL at 21:37

## 2017-09-17 RX ADMIN — OXYCODONE HYDROCHLORIDE AND ACETAMINOPHEN 2 TABLET: 7.5; 325 TABLET ORAL at 09:31

## 2017-09-17 RX ADMIN — ALPRAZOLAM 0.5 MG: 0.5 TABLET ORAL at 14:31

## 2017-09-17 RX ADMIN — OXYCODONE HYDROCHLORIDE AND ACETAMINOPHEN 2 TABLET: 7.5; 325 TABLET ORAL at 13:30

## 2017-09-17 NOTE — PLAN OF CARE
Problem: Perioperative Period (Adult)  Goal: Signs and Symptoms of Listed Potential Problems Will be Absent or Manageable (Perioperative Period)  Outcome: Ongoing (interventions implemented as appropriate)    Problem: Pain, Acute (Adult)  Goal: Identify Related Risk Factors and Signs and Symptoms  Outcome: Ongoing (interventions implemented as appropriate)  Goal: Acceptable Pain Control/Comfort Level  Outcome: Ongoing (interventions implemented as appropriate)    Problem: Fall Risk (Adult)  Goal: Identify Related Risk Factors and Signs and Symptoms  Outcome: Ongoing (interventions implemented as appropriate)  Goal: Absence of Falls  Outcome: Ongoing (interventions implemented as appropriate)

## 2017-09-17 NOTE — PROGRESS NOTES
Discharge Planning Assessment  [facility]     Patient Name: Pretty Israel  MRN: [id number]  Today's Date: 9/17/2017    Admit Date: 9/15/2017          Discharge Needs Assessment       09/17/17 7895    Living Environment    Lives With child(osmani), dependent;significant other    Living Arrangements house    Home Accessibility bed not on first floor;stairs within home    Stair Railings at Home inside, present at both sides    Type of Financial/Environmental Concern none    Transportation Available car;family or friend will provide    Living Environment    Provides Primary Care For no one    Quality Of Family Relationships supportive    Able to Return to Prior Living Arrangements other (see comments)    Living Arrangement Comments Pt. concerned about returning home at current level of function- ambulated 45 feet with P.T. with assist. Sig. Oth. works and bedroom is on second floor.     Discharge Needs Assessment    Concerns To Be Addressed discharge planning concerns    Equipment Currently Used at Home none            Discharge Plan       09/17/17 1901    Case Management/Social Work Plan    Plan CCP to follow-up Monday to further discuss rehab with pt./Significant Other Gaston ([phone]), will need list of in network Passport facilities and would also like pricing/coverage information for BSC and hospital bed from [facility] incase she goes home. Message left for Sofia/Sai..............Khalif IVORY     Patient/Family In Agreement With Plan yes    Additional Comments S/W Pt. after receiving call from RN stating pt. and Sig. Other voiced concerns about pt. returning home unable to ambulate well and unable to go to the restroom unassisted. S/W Pt. who states she and Sig. Oth live with their dependent children in a two-story house and she was IADL's prior to admit. Pt. had not anticipated going home so quickly after surgery and thought she would be moving around better at this point, per P.T. notes pt. ambulated 45  feet with assist. Pt. states she is concerned she will not be able to get to second floor bedroom and sig. oth. works during the day. Discussed options for going home, family staying with patient vs. private caregivers. HH or [facility]. Did discuss if pt. could have hospital bed on first floor when she first returned home and she states there is a first floor bathroom. Pt. states she would like to go to rehab at [place] and would like CCP to speak with Significant Other about in network facilities. S/W Gaston Ragland/Significant other [phone] and informed CCP will follow-up tomorrow to check with facility reps to see who may have Passport availability and to provide with updated list of in network facilities. Gaston states pt. may be able to return home if she gets a few more sessions with P.T., would like to know pricing/coverage for hospital bed and BSC and ok with CCP checking with [facility]. VM to [facility] and asked to follow-up with pt./CCP tomorrow.............Khalif IVORY         Discharge Placement     No information found        Expected Discharge Date and Time     Expected Discharge Date Expected Discharge Time    Sep 17, 2017               Demographic Summary     None            Functional Status       09/17/17 3712    Functional Status Current    Ambulation 3-->assistive equipment and person    Transferring 3-->assistive equipment and person    Toileting 2-->assistive person    Bathing 2-->assistive person    Dressing 2-->assistive person    Eating 0-->independent    Communication 0-->understands/communicates without difficulty    Swallowing (if score 2 or more for any item, consult Rehab Services) 0-->swallows foods/liquids without difficulty    Change in Functional Status Since Onset of Current Illness/Injury yes    Functional Status Prior    Ambulation 0-->independent    Transferring 0-->independent    Toileting 0-->independent    Bathing 0-->independent    Dressing 0-->independent    Eating 0-->independent     Communication 0-->understands/communicates without difficulty    Swallowing 0-->swallows foods/liquids without difficulty            Psychosocial     None            Abuse/Neglect     None            Legal     None            Substance Abuse     None            Patient Forms     None          Rosalie Ramirez, RN

## 2017-09-17 NOTE — PLAN OF CARE
Problem: Patient Care Overview (Adult)  Goal: Plan of Care Review  Outcome: Ongoing (interventions implemented as appropriate)    09/17/17 1239   Coping/Psychosocial Response Interventions   Plan Of Care Reviewed With patient   Outcome Evaluation   Outcome Summary/Follow up Plan Pt demonstrates some increased activity tolerance as she was able to increase her gait distance and required less assistance. Pt continues to be limited by pain and weakness. Pt will continue to follow to address these deficits.

## 2017-09-17 NOTE — NURSING NOTE
Paged Dr. Johns at [phone] at [phone] & [phone] in regards to patient's discharge. Continue to await return call.

## 2017-09-17 NOTE — PLAN OF CARE
Problem: Patient Care Overview (Adult)  Goal: Plan of Care Review  Outcome: Ongoing (interventions implemented as appropriate)    Problem: Pain, Acute (Adult)  Goal: Identify Related Risk Factors and Signs and Symptoms  Outcome: Ongoing (interventions implemented as appropriate)    09/15/17 1818   Pain, Acute   Related Risk Factors (Acute Pain) surgery;positioning   Signs and Symptoms (Acute Pain) fear of reinjury;verbalization of pain descriptors       Goal: Acceptable Pain Control/Comfort Level  Outcome: Ongoing (interventions implemented as appropriate)    09/15/17 1818   Pain, Acute (Adult)   Acceptable Pain Control/Comfort Level making progress toward outcome         Problem: Fall Risk (Adult)  Goal: Identify Related Risk Factors and Signs and Symptoms  Outcome: Ongoing (interventions implemented as appropriate)    09/15/17 1818   Fall Risk   Fall Risk: Related Risk Factors depression/anxiety;history of falls;gait/mobility problems;fear of falling;sensory deficits   Fall Risk: Signs and Symptoms presence of risk factors       Goal: Absence of Falls  Outcome: Ongoing (interventions implemented as appropriate)    09/17/17 1619   Fall Risk (Adult)   Absence of Falls making progress toward outcome

## 2017-09-17 NOTE — THERAPY TREATMENT NOTE
Acute Care - Physical Therapy Treatment Note  [facility]     Patient Name: Pretty Israel  : 1985  MRN: [id number]  Today's Date: 2017     Date of Referral to PT: 09/15/17       Admit Date: 9/15/2017    Visit Dx:  No diagnosis found.  Patient Active Problem List   Diagnosis   • Acute midline low back pain with left-sided sciatica   • Tobacco abuse   • Chronic tachycardia               Adult Rehabilitation Note       17 1614          Rehab Assessment/Intervention    Discipline physical therapist  -      Document Type therapy note (daily note)  -      Subjective Information agree to therapy  -      Patient Effort, Rehab Treatment good  -CH      Symptoms Noted During/After Treatment increased pain  -      Precautions/Limitations fall precautions  -CH      Recorded by [CH] Lauren Waldron, PT      Pain Assessment    Pain Assessment 0-10  -      Pain Score 7  -      Pain Location Back  -      Pain Intervention(s) Repositioned  -CH      Recorded by [CH] Lauren Waldron, PT      Cognitive Assessment/Intervention    Current Cognitive/Communication Assessment functional  -      Orientation Status oriented x 4  -      Follows Commands/Answers Questions 100% of the time  -      Personal Safety WNL/WFL  -      Personal Safety Interventions fall prevention program maintained;gait belt;nonskid shoes/slippers when out of bed  -      Recorded by [CH] Lauren Waldron, PT      Bed Mobility, Assessment/Treatment    Bed Mob, Supine to Sit, [place] nonverbal cues required (demo/gesture);verbal cues required;moderate assist (50% patient effort)  -      Bed Mob, Sit to Supine, [place] verbal cues required;nonverbal cues required (demo/gesture);moderate assist (50% patient effort)  -      Bed Mobility, Comment pt instructed on log roll during supine to sit and sit to supine  -      Recorded by [CH] Lauren Waldron, PT      Transfer Assessment/Treatment    Transfers,  Sit-Stand [place] verbal cues required;nonverbal cues required (demo/gesture);minimum assist (75% patient effort)  -      Transfers, Stand-Sit [place] verbal cues required;nonverbal cues required (demo/gesture);minimum assist (75% patient effort)  -      Transfers, Sit-Stand-Sit, Assist Device rolling walker  -      Transfer, Comment brace donned while sitting EOB  -CH      Recorded by [CH] Lauren Waldron, PT      Gait Assessment/Treatment    Gait, [place] Level nonverbal cues required (demo/gesture);verbal cues required;minimum assist (75% patient effort)  -      Gait, Assistive Device rolling walker  -      Gait, Distance (Feet) 80   +15 ft bathroom to bed  -CH      Gait, Gait Deviations shawanda decreased;step length decreased;stride length decreased;antalgic  -      Gait, Safety Issues step length decreased  -      Gait, Impairments strength decreased;pain;impaired balance  -      Gait, Comment pt with very slow gait, pt reports her hips are burning during gait  -CH      Recorded by [CH] Lauren Waldron, PT      Motor Skills/Interventions    Additional Documentation Balance Skills Training (Group)  -CH      Recorded by [CH] Lauren Waldron, PT      Positioning and Restraints    Pre-Treatment Position in bed  -CH      Post Treatment Position bed  -CH      In Bed supine;call light within reach;encouraged to call for assist  -CH      Recorded by [] Lauren Waldron, PT        User Key  (r) = Recorded By, (t) = Taken By, (c) = Cosigned By    Initials Name Effective Dates     Lauren Waldron, PT 12/01/15 -                 IP PT Goals       09/16/17 1458          Bed Mobility PT LTG    Bed Mobility PT LTG, Date Established 09/16/17  -AL      Bed Mobility PT LTG, Time to Achieve 1 wk  -AL      Bed Mobility PT LTG, Activity Type all bed mobility  -AL      Bed Mobility PT LTG, [place] Level supervision required  -AL      Transfer Training PT LTG    Transfer Training PT  LTG, Date Established 09/16/17  -AL      Transfer Training PT LTG, Time to Achieve 1 wk  -AL      Transfer Training PT LTG, Activity Type bed to chair /chair to bed;sit to stand/stand to sit  -AL      Transfer Training PT LTG, [place] Level contact guard assist  -AL      Transfer Training PT LTG, Assist Device walker, rolling  -AL      Gait Training PT LTG    Gait Training Goal PT LTG, Date Established 09/16/17  -AL      Gait Training Goal PT LTG, Time to Achieve 1 wk  -AL      Gait Training Goal PT LTG, [place] Level contact guard assist  -AL      Gait Training Goal PT LTG, Assist Device walker, rolling  -AL      Gait Training Goal PT LTG, Distance to Achieve 100  -AL        User Key  (r) = Recorded By, (t) = Taken By, (c) = Cosigned By    Initials Name Provider Type    SALONI Rousseau, PT Physical Therapist          Physical Therapy Education     Title: PT OT SLP Therapies (Done)     Topic: Physical Therapy (Done)     Point: Mobility training (Done)    Learning Progress Summary    Learner Readiness Method Response Comment Documented by Status   Patient Acceptance E [facility] 09/16/17 1456 Done               Point: Home exercise program (Done)    Learning Progress Summary    Learner Readiness Method Response Comment Documented by Status   Patient Acceptance E [facility] 09/16/17 1456 Done               Point: Body mechanics (Done)    Learning Progress Summary    Learner Readiness Method Response Comment Documented by Status   Patient Acceptance E [facility] 09/16/17 1456 Done               Point: Precautions (Done)    Learning Progress Summary    Learner Readiness Method Response Comment Documented by Status   Patient Acceptance E [facility] 09/16/17 1456 Done                      User Key     Initials Effective Dates Name Provider Type Discipline    AL 12/01/15 -  Freida Rousseau, PT Physical Therapist PT                    PT Recommendation and Plan  Anticipated Equipment Needs At Discharge: two wheeled walker  Anticipated  Discharge Disposition: inpatient rehabilitation facility, home with home health  PT Frequency: daily  Plan of Care Review  Plan Of Care Reviewed With: patient  Outcome Summary/Follow up Plan: Pt demonstrates some increased activity tolerance as she was able to increase her gait distance and required less assistance. Pt continues to be limited by pain and weakness. Pt will continue to follow to address these deficits. (Simultaneous filing. User may be unaware of other data.)          Outcome Measures       09/17/17 1600 09/16/17 1400       How much help from another person do you currently need...    Turning from your back to your side while in flat bed without using bedrails? 2  -CH 2  -AL     Moving from lying on back to sitting on the side of a flat bed without bedrails? 2  -CH 2  -AL     Moving to and from a bed to a chair (including a wheelchair)? 3  -CH 3  -AL     Standing up from a chair using your arms (e.g., wheelchair, bedside chair)? 3  -CH 3  -AL     Climbing 3-5 steps with a railing? 2  -CH 2  -AL     To walk in hospital room? 3  -CH 3  -AL     AM-PAC 6 Clicks Score 15  -CH 15  -AL     Functional Assessment    Outcome Measure Options AM-PAC 6 Clicks Basic Mobility (PT)  -CH AM-PAC 6 Clicks Basic Mobility (PT)  -AL       User Key  (r) = Recorded By, (t) = Taken By, (c) = Cosigned By    Initials Name Provider Type    ROJELIO Waldron, LISA Physical Therapist    SALONI Rousseau, PT Physical Therapist           Time Calculation:         PT Charges       09/17/17 1620          Time Calculation    Start Time 1532  -      Stop Time 1614  -      Time Calculation (min) 42 min  -      PT Received On 09/17/17  -      PT - Next Appointment 09/18/17  -        User Key  (r) = Recorded By, (t) = Taken By, (c) = Cosigned By    Initials Name Provider Type    ROJELIO Waldron PT Physical Therapist          Therapy Charges for Today     Code Description Service Date Service Provider Modifiers Qty     41278411922 HC PT THER PROC EA 15 MIN 9/17/2017 Lauren Waldorn, PT GP 3    29061707489 HC PT THER SUPP EA 15 MIN 9/17/2017 Lauren Waldron, PT GP 2          PT G-Codes  Outcome Measure Options: AM-PAC 6 Clicks Basic Mobility (PT)    Lauren Waldron, PT  9/17/2017

## 2017-09-18 VITALS
BODY MASS INDEX: 35.07 KG/M2 | SYSTOLIC BLOOD PRESSURE: 116 MMHG | HEIGHT: 66 IN | DIASTOLIC BLOOD PRESSURE: 71 MMHG | TEMPERATURE: 98.5 F | WEIGHT: 218.2 LBS | HEART RATE: 80 BPM | OXYGEN SATURATION: 96 % | RESPIRATION RATE: 16 BRPM

## 2017-09-18 PROCEDURE — 97110 THERAPEUTIC EXERCISES: CPT

## 2017-09-18 RX ADMIN — OXYCODONE HYDROCHLORIDE AND ACETAMINOPHEN 2 TABLET: 7.5; 325 TABLET ORAL at 02:03

## 2017-09-18 RX ADMIN — OXYCODONE HYDROCHLORIDE AND ACETAMINOPHEN 2 TABLET: 7.5; 325 TABLET ORAL at 05:44

## 2017-09-18 RX ADMIN — POLYETHYLENE GLYCOL 3350 17 G: 17 POWDER, FOR SOLUTION ORAL at 14:44

## 2017-09-18 RX ADMIN — CYCLOBENZAPRINE HYDROCHLORIDE 10 MG: 10 TABLET, FILM COATED ORAL at 05:44

## 2017-09-18 RX ADMIN — OXYCODONE HYDROCHLORIDE AND ACETAMINOPHEN 2 TABLET: 7.5; 325 TABLET ORAL at 09:38

## 2017-09-18 RX ADMIN — OXYCODONE HYDROCHLORIDE AND ACETAMINOPHEN 2 TABLET: 7.5; 325 TABLET ORAL at 13:48

## 2017-09-18 RX ADMIN — CYCLOBENZAPRINE HYDROCHLORIDE 10 MG: 10 TABLET, FILM COATED ORAL at 14:44

## 2017-09-18 RX ADMIN — ALPRAZOLAM 0.5 MG: 0.5 TABLET ORAL at 08:12

## 2017-09-18 NOTE — PLAN OF CARE
Problem: Pain, Acute (Adult)  Goal: Acceptable Pain Control/Comfort Level  Outcome: Ongoing (interventions implemented as appropriate)    09/18/17 0809   Pain, Acute (Adult)   Acceptable Pain Control/Comfort Level making progress toward outcome         Problem: Fall Risk (Adult)  Goal: Absence of Falls  Outcome: Ongoing (interventions implemented as appropriate)    09/18/17 0809   Fall Risk (Adult)   Absence of Falls making progress toward outcome

## 2017-09-18 NOTE — PLAN OF CARE
Problem: Patient Care Overview (Adult)  Goal: Plan of Care Review  Outcome: Ongoing (interventions implemented as appropriate)    09/18/17 0925   Coping/Psychosocial Response Interventions   Plan Of Care Reviewed With patient   Patient Care Overview   Progress progress toward functional goals as expected   Outcome Evaluation   Outcome Summary/Follow up Plan Pt increasing activity tolerance and amb safety would benefit from rehab to increase with stair navigation and amb safety with RWX due to unsteadiness

## 2017-09-18 NOTE — THERAPY TREATMENT NOTE
Acute Care - Physical Therapy Treatment Note  Psychiatric     Patient Name: Pretty Israel  : 1985  MRN: [id number]  Today's Date: 2017     Date of Referral to PT: 09/15/17       Admit Date: 9/15/2017    Visit Dx:  No diagnosis found.  Patient Active Problem List   Diagnosis   • Acute midline low back pain with left-sided sciatica   • Tobacco abuse   • Chronic tachycardia               Adult Rehabilitation Note       17 0900 17 1614       Rehab Assessment/Intervention    Discipline physical therapy assistant  -CW physical therapist  -CH     Document Type therapy note (daily note)  -CW therapy note (daily note)  -     Subjective Information agree to therapy;complains of;pain  -CW agree to therapy  -CH     Patient Effort, Rehab Treatment good  -CW good  -CH     Symptoms Noted During/After Treatment  increased pain  -CH     Precautions/Limitations fall precautions  -CW fall precautions  -CH     Recorded by [CW] Zhang Banda [CH] Lauren Waldron, PT     Vital Signs    O2 Delivery Pre Treatment room air  -CW      Recorded by [CW] Zhang Banda      Pain Assessment    Pain Assessment 0-10  -CW 0-10  -CH     Pain Score 6  -CW 7  -CH     Post Pain Score 7  -CW      Pain Location Back  -CW Back  -CH     Pain Intervention(s) Repositioned;Ambulation/increased activity  -CW Repositioned  -CH     Response to Interventions napoleon  -CW      Recorded by [CW] Zhang Banda [CH] Lauren Waldron, PT     Cognitive Assessment/Intervention    Current Cognitive/Communication Assessment functional  -CW functional  -CH     Orientation Status oriented x 4  -CW oriented x 4  -CH     Follows Commands/Answers Questions 100% of the time  -% of the time  -CH     Personal Safety WNL/WFL  -CW WNL/WFL  -CH     Personal Safety Interventions fall prevention program maintained;gait belt;muscle strengthening facilitated;nonskid shoes/slippers when out of bed  -CW fall prevention program  maintained;gait belt;nonskid shoes/slippers when out of bed  -     Recorded by [CW] Zhang Banda [CH] Lauren Waldron, PT     Bed Mobility, Assessment/Treatment    Bed Mob, Supine to Sit, [place] minimum assist (75% patient effort)  -CW nonverbal cues required (demo/gesture);verbal cues required;moderate assist (50% patient effort)  -     Bed Mob, Sit to Supine, [place] minimum assist (75% patient effort)  -CW verbal cues required;nonverbal cues required (demo/gesture);moderate assist (50% patient effort)  -     Bed Mobility, Comment  pt instructed on log roll during supine to sit and sit to supine  -CH     Recorded by [CW] Zhang Banda [] Lauren Waldron PT     Transfer Assessment/Treatment    Transfers, Sit-Stand Clark contact guard assist  -CW verbal cues required;nonverbal cues required (demo/gesture);minimum assist (75% patient effort)  -     Transfers, Stand-Sit [place] contact guard assist  -CW verbal cues required;nonverbal cues required (demo/gesture);minimum assist (75% patient effort)  -     Transfers, Sit-Stand-Sit, Assist Device rolling walker  -CW rolling walker  -     Transfer, Comment  brace donned while sitting EOB  -CH     Recorded by [CW] Zhang Banda [] Lauren Waldron PT     Gait Assessment/Treatment    Gait, [place] Level contact guard assist  - nonverbal cues required (demo/gesture);verbal cues required;minimum assist (75% patient effort)  -     Gait, Assistive Device rolling walker  -CW rolling walker  -     Gait, Distance (Feet) 80  -CW 80   +15 ft bathroom to bed  -     Gait, Gait Deviations shawanda decreased;step length decreased;stride length decreased  - shawanda decreased;step length decreased;stride length decreased;antalgic  -     Gait, Safety Issues step length decreased  - step length decreased  -     Gait, Impairments strength decreased;pain  - strength decreased;pain;impaired balance  -      Gait, Comment  pt with very slow gait, pt reports her hips are burning during gait  -CH     Recorded by [CW] Zhang Banda [CH] Lauren Waldron, PT     Stairs Assessment/Treatment    Number of Stairs 6  -CW      Stairs, Handrail Location left side (ascending)  -CW      Stairs, [place] Level minimum assist (75% patient effort)  -CW      Stairs, Technique Used step to step (descending);step to step (ascending)  -CW      Stairs, Impairments strength decreased;pain  -CW      Recorded by [CW] Zhang Banda      Motor Skills/Interventions    Additional Documentation  Balance Skills Training (Group)  -CH     Recorded by  [CH] Lauren Waldron, PT     Positioning and Restraints    Pre-Treatment Position standing in room  -CW in bed  -CH     Post Treatment Position bed  -CW bed  -CH     In Bed notified nsg;side lying left;call light within reach;encouraged to call for assist  -CW supine;call light within reach;encouraged to call for assist  -CH     Recorded by [CW] Zhang Banda [CH] Lauren Waldron, PT       User Key  (r) = Recorded By, (t) = Taken By, (c) = Cosigned By    Initials Name Effective Dates     Lauren Waldron, PT 12/01/15 -     CW Zhang Banda 12/13/16 -                 IP PT Goals       09/16/17 1458          Bed Mobility PT LTG    Bed Mobility PT LTG, Date Established 09/16/17  -AL      Bed Mobility PT LTG, Time to Achieve 1 wk  -AL      Bed Mobility PT LTG, Activity Type all bed mobility  -AL      Bed Mobility PT LTG, [place] Level supervision required  -AL      Transfer Training PT LTG    Transfer Training PT LTG, Date Established 09/16/17  -AL      Transfer Training PT LTG, Time to Achieve 1 wk  -AL      Transfer Training PT LTG, Activity Type bed to chair /chair to bed;sit to stand/stand to sit  -AL      Transfer Training PT LTG, [place] Level contact guard assist  -AL      Transfer Training PT LTG, Assist Device walker, rolling  -AL      Gait Training PT LTG     Gait Training Goal PT LTG, Date Established 09/16/17  -AL      Gait Training Goal PT LTG, Time to Achieve 1 wk  -AL      Gait Training Goal PT LTG, [place] Level contact guard assist  -AL      Gait Training Goal PT LTG, Assist Device walker, rolling  -AL      Gait Training Goal PT LTG, Distance to Achieve 100  -AL        User Key  (r) = Recorded By, (t) = Taken By, (c) = Cosigned By    Initials Name Provider Type    SALONI Rousseau, PT Physical Therapist          Physical Therapy Education     Title: PT OT SLP Therapies (Done)     Topic: Physical Therapy (Done)     Point: Mobility training (Done)    Learning Progress Summary    Learner Readiness Method Response Comment Documented by Status   Patient Acceptance E,JENNY ESCALANTE   09/18/17 0925 Done    Acceptance E VU  AL 09/16/17 1456 Done               Point: Home exercise program (Done)    Learning Progress Summary    Learner Readiness Method Response Comment Documented by Status   Patient Acceptance E,JENNY ESCALANTE   09/18/17 0925 Done    Acceptance E VU  AL 09/16/17 1456 Done               Point: Body mechanics (Done)    Learning Progress Summary    Learner Readiness Method Response Comment Documented by Status   Patient Acceptance E,JENNY ESCALANTE   09/18/17 0925 Done    Acceptance E VU  AL 09/16/17 1456 Done               Point: Precautions (Done)    Learning Progress Summary    Learner Readiness Method Response Comment Documented by Status   Patient Acceptance E,JENNY ESCALANTE   09/18/17 0925 Done    Acceptance E VU  AL 09/16/17 1456 Done                      User Key     Initials Effective Dates Name Provider Type Discipline    AL 12/01/15 -  Freida Rousseau, PT Physical Therapist PT     12/13/16 -  Zhang Banda Physical Therapy Assistant PT                    PT Recommendation and Plan  Anticipated Equipment Needs At Discharge: two wheeled walker  Anticipated Discharge Disposition: inpatient rehabilitation facility, home with home health  PT Frequency:  daily  Plan of Care Review  Plan Of Care Reviewed With: patient  Progress: progress toward functional goals as expected  Outcome Summary/Follow up Plan: Pt increasing activity tolerance and amb safety would benefit from rehab to increase with stair navigation and amb safety with RWX due to unsteadiness           Outcome Measures       09/18/17 0900 09/17/17 1600 09/16/17 1400    How much help from another person do you currently need...    Turning from your back to your side while in flat bed without using bedrails? 3  -CW 2  -CH 2  -AL    Moving from lying on back to sitting on the side of a flat bed without bedrails? 3  -CW 2  -CH 2  -AL    Moving to and from a bed to a chair (including a wheelchair)? 3  -CW 3  -CH 3  -AL    Standing up from a chair using your arms (e.g., wheelchair, bedside chair)? 3  -CW 3  -CH 3  -AL    Climbing 3-5 steps with a railing? 2  -CW 2  -CH 2  -AL    To walk in hospital room? 3  -CW 3  -CH 3  -AL    AM-PAC 6 Clicks Score 17  -CW 15  -CH 15  -AL    Functional Assessment    Outcome Measure Options AM-PAC 6 Clicks Basic Mobility (PT)  -CW AM-PAC 6 Clicks Basic Mobility (PT)  -CH AM-PAC 6 Clicks Basic Mobility (PT)  -AL      User Key  (r) = Recorded By, (t) = Taken By, (c) = Cosigned By    Initials Name Provider Type    ROJELIO Waldron, PT Physical Therapist    SALONI Rousseau, PT Physical Therapist    DAYNA Banda Physical Therapy Assistant           Time Calculation:         PT Charges       09/18/17 0929          Time Calculation    Start Time 0900  -CW      Stop Time 0929  -CW      Time Calculation (min) 29 min  -CW      PT Received On 09/18/17  -CW      PT - Next Appointment 09/19/17  -CW        User Key  (r) = Recorded By, (t) = Taken By, (c) = Cosigned By    Initials Name Provider Type    DAYNA Banda Physical Therapy Assistant          Therapy Charges for Today     Code Description Service Date Service Provider Modifiers Qty    45809515172 HC PT THER PROC EA  15 MIN 9/18/2017 Zhang Banda GP 2    75170688894 HC PT THER SUPP EA 15 MIN 9/18/2017 Zhang Banda GP 2          PT G-Codes  Outcome Measure Options: AM-PAC 6 Clicks Basic Mobility (PT)    Zhang Banda  9/18/2017

## 2017-09-18 NOTE — DISCHARGE SUMMARY
Date of Admission: 9/15/2017  7:14 AM  Date of Discharge:  9/18/2017    Discharge Diagnosis: s/p Procedure(s):  MINIMINALLY INVASIVE SPINAL TRANSFORMINAL LUMBAR INTERBODY FUSION L5-S1,      Presenting Problem/History of Present Illness  Stenosis, spinal, lumbar [M48.06]     Attending Physician: Ten Johns DO    Consults:   Consults     Date and Time Order Name Status Description    9/15/2017 4757 Inpatient Consult to Hospitalist Completed     8/30/2017 0789 [facility] (on-call MD unless specified) Completed           Procedures Performed  Procedure(s):  MINIMINALLY INVASIVE SPINAL TRANSFORMINAL LUMBAR INTERBODY FUSION L5-S1       Hospital Course  Patient is a 32 y.o. female presented with back and leg painwhich was unresponsive to conservative treatment.  The patient presented to the operating room for surgery.  DVT and antibiotic prophylaxis were given.  The patient was transferred to [facility].   Physical therapy was consulted to assist with ambulation and transfers.  Norton catheter was removed.she is having continued difficulty with transfers and pain control.    Condition on Discharge:  The patient's pain was adequately controlled and the patient was thought to be hemodynamically stable for discharge.    Discharge Disposition  Subacute rehabilitation      Discharge Medications   Pretty Israel   Home Medication Instructions FARHAD:[id number]    Printed on:09/18/17 1312   Medication Information                      cyclobenzaprine (FLEXERIL) 10 MG tablet  Take 1 tablet by mouth 3 (Three) Times a Day As Needed for Muscle Spasms for up to 14 days.             ibuprofen (ADVIL,MOTRIN) 200 MG tablet  Take 800 mg by mouth Every 6 (Six) Hours As Needed for Mild Pain . WILL HOLD FOR SURGERY.             oxyCODONE-acetaminophen (PERCOCET) 7.5-325 MG per tablet  Take 1 tablet by mouth 2 (Two) Times a Day As Needed for Moderate Pain .             oxyCODONE-acetaminophen (PERCOCET) 7.5-325 MG per tablet  Take 1 tablet by  mouth Every 6 (Six) Hours As Needed for Moderate Pain  (1-2 tablets) for up to 14 days.                 Discharge Diet:   Diet Instructions     Diet:       Diet Texture / Consistency:  Regular       Diet:       Diet Texture / Consistency:  Regular                 Activity at Discharge:   Activity Instructions     Other Restrictions (Specify)       Dr. Ten Johns  [phone]      Post-op Instructions: Lumbar fusion      What are my limitations after surgery?  No lifting over 10lbs, pushing, pulling, or twisting for the first 6 weeks. It is ok to put on your socks and shoes.   Walking after surgery helps speed recovery and also helps to prevent post-operative blood clots.  You are encouraged to walk daily and increase distance as tolerated.  Activity will be gradually increased after 6 weeks. This will be discussed in detail at your follow up appointment.  Avoid running, jogging, or bicycle riding until instructed to do so. It is not unusual to have intermittent pain or numbness in the affected leg after surgery and with increased activity. Do not be alarmed.  Wear your back brace during waking hours during the first 6 weeks. You may take it off to shower and sleep.  You may drive after 2 weeks. Do not drive while on narcotic pain medication.     Wound care  You may remove your dressing after 48 hours. If you still have drainage after 48 hours, apply a clean dressing as needed. Keep the incision site clean and dry.  Your incision is closed with medical glue and internal sutures.  Unless you are told otherwise, there are no stitches to be removed.  Do not scrub the incision site or pick at the glue.  The glue helps to hold your incision closed as your body heals. It also helps to prevent infection.  Showering is permitted after the first 48 hours as long as the wound is covered and kept dry. Do not get the incision site wet until it is completely scabbed over and no longer draining.   Do not apply any lotions or  ointments to the wound until it is completely healed.  Do not submerge the incision site in a bathtub or pool until it is completely healed and free of scab.    Pain Control, Medication and Refills  You will be given prescriptions for pain medication at the time of discharge.   Reduce pain and swelling by applying ice to your back for 15 minutes at a time. You can do this several times a day for the first few days after surgery.   Call the office for prescription refills. PLEASE GIVE 48 HOURS NOTICE FOR ALL REFILL REQUESTS. ALSO NOTE WE DO NOT TAKE PRESCRIPTION CALLS ON WEEKENDS OR HOLIDAYS.  Narcotics and inactivity can cause constipation. Drink plenty of fluids and eat a high fiber diet. You may need to take an over-the-counter stool softener or laxative.  As the pain improves, you may try taking over the counter acetaminophen (Tylenol) for pain instead of narcotics. Most narcotics also contain Tylenol, so be careful if you are combining these drugs with other Tylenol containing products. Do not exceed the recommended daily dose of Tylenol.  Some studies have implied that anti-inflammatory medicine (NSAIDs) can delay bone healing.  While I recommend avoiding prescription anti-inflammatory medicines, it is probably OK to take over the counter NSAIDs. However, you may want to minimize their use if possible during the first three months after surgery. Examples of prescription NSAIDs include meloxicam (Mobic), Celebrex, Diclofenac, Voltaren, Indocin and Toradol. OTC NSAIDs include ibuprofen (Motrin, Advil), naproxen (Aleve).              Healing  Some patients may receive a bone stimulator to wear after surgery. This is used to help with bone healing if you have certain risk factors.  Do not smoke. Smoking interferes with bone formation and circulation. It also contributes to prolonged back pain.      Who do I call if I have problems after surgery?  Please call our office at [phone] if you develop any of the  following:     Fever (over 101.5°), chills, or sweats  New pain or weakness that began after you left the hospital  New bowel or bladder difficulty  Excessive swelling around your incision, excessive drainage or pus coming from the incision site  Difficulty breathing or swallowing.   New pain or swelling in the calves.    Office hours: Monday - Friday 8:00 a.m. - 4:30 p.m.    In case of an emergency after business hours, please contact me at the phone number given to you at the time of discharge or go to the nearest emergency room.      When is my next office appointment?  You should have your first post-op visit in 2 weeks.   Your office visit will be at the [facility] located at [address] [place] [address] [address] [place] [address]                 Follow-up Appointments  2 weeks

## 2017-09-18 NOTE — PROGRESS NOTES
Orthopedic Spine Progress Note    Subjective     Post-Operative Day: 3 post-spine procedure MINIMINALLY INVASIVE SPINAL TRANSFORMINAL LUMBER INTERBODY FUSION L5-S1  Systemic or Specific Complaints: Complains of low back pressure and hip and buttock pain. Difficulty with transfers.  She denies any leg pain.    Objective     Vital signs in last 24 hours:  Temp:  [98.2 °F (36.8 °C)-99 °F (37.2 °C)] 98.9 °F (37.2 °C)  Heart Rate:  [106-113] 106  Resp:  [16-18] 16  BP: (113-124)/(68-87) 113/68    General: alert, appears stated age and cooperative   Neurovascular: stable   Wound: Wound clean and dry no evidence of infection.   Range of Motion: limited   DVT Exam: No evidence of DVT seen on physical exam.     Data Review  CBC:    Results from last 7 days  Lab Units 09/17/17  0605   WBC 10*3/mm3 5.96   RBC 10*6/mm3 3.15*   HEMOGLOBIN g/dL 8.8*   HEMATOCRIT % 27.5*   PLATELETS 10*3/mm3 198     Lab Results   Component Value Date    GLUCOSE 99 09/17/2017    BUN 10 09/17/2017    CREATININE 0.68 09/17/2017    EGFRIFNONA 99 09/17/2017    BCR 14.7 09/17/2017    K 3.7 09/17/2017    CO2 26.3 09/17/2017    CALCIUM 8.5 (L) 09/17/2017    ALBUMIN 4.70 09/13/2017    LABIL2 1.7 09/13/2017    AST 14 09/13/2017    ALT 26 09/13/2017         Assessment/Plan     Status post-spine procedure: Doing well postoperatively.     Pain Relief: some relief  Continues current post-op course.  Continue pain control  Up with physical therapy  Looking for bed in [facility].    Activity: out of bed and ambulate    Weight Bearing: FWB     LOS: 3 days     HARSHA Zamora    Date: 9/18/2017

## 2017-09-18 NOTE — DISCHARGE PLACEMENT REQUEST
"Ish Horn (32 y.o. Female)     Date of Birth Social Security Number Address Home Phone MRN    1985  [address] [place], [place] [address] [phone] [id number]    Zoroastrian Marital Status          Pentecostal        Admission Date Admission Type Admitting Provider Attending Provider Department, Room/Bed    9/15/17 Elective Ten Johns, Ten Lopez,  [address], P582/1    Discharge Date Discharge Disposition Discharge Destination         Home or Self Care             Attending Provider: Ten Johns DO     Allergies:  No Known Allergies    Isolation:  None   Infection:  None   Code Status:  FULL    Ht:  66\" (167.6 cm)   Wt:  218 lb 3.2 oz (99 kg)    Admission Cmt:  None   Principal Problem:  Stenosis, spinal, lumbar [M48.06]                 Active Insurance as of 9/15/2017     Primary Coverage     Payor Plan Insurance Group Employer/Plan Group    PASSPORT PASSPORT      Payor Plan Address Payor Plan Phone Number Effective From Effective To    [address] [phone] 10/1/1999     [place] [address]       Subscriber Name Subscriber Birth Date Member ID       ISH HORN 1985 [id number]                 Emergency Contacts      (Rel.) Home Phone Work Phone Mobile Phone    Gaston Ragland (Significant Other) [phone] -- --              "

## 2017-09-18 NOTE — PROGRESS NOTES
Case Management Discharge Note    Final Note: skilled bed at [facility]    Discharge Placement     Facility/Agency Request Status Selected? Address Phone Number Fax Number    [facility] Accepted    Yes [address], [facility] [address] [phone]    [facility] Declined   no passport beds     [address], [facility] [address] [phone]    [facility] AT [facility] Declined   no passport beds at any [facility] facilities     [address], [facility] [address] [phone]    [facility] Decl[facility]   no passport beds at any [facility] facilities     [address] [facility] [address] [phone]   1040:  Met w/ patient in room.  Advised of several referrals looking for a passport bed.  No beds at [facility], [facility], [facility] or [facility].  Advised a bed is available at [facility] and they accept her insurance.  Patient agreed to facility and Nickie was updated.  Obtained report number and they can take today.  HARSHA Serna advised and will enter dc summary.  Faxed her 30 day expempt form to sign for facility.  Scripts signed and on chart.       Other: Other (pvt car)    Discharge Codes: 03  Discharged/transferred to skilled nursing facility (SNF) with Medicare certification in anticipation of skilled care

## 2017-09-18 NOTE — PROGRESS NOTES
Case Management Discharge Note    Final Note: skilled bed at [facility]     1350: Per Nickie, [facility] also had a bed and patient chose to go there instead.    Discharge Placement     Facility/Agency Request Status Selected? Address Phone Number Fax Number    [facility] Accepted    Yes [address], [facility] [address] [phone]    [facility] Accepted     [address], [facility] [address] [phone]    [facility] Declined   no passport beds     [address], [facility] [address] [phone]    [facility]IGNATURE AT [facility] Declined   no passport beds at any [facility] facilities     [address] [facility] [address] [phone]    [facility] Declined   no passport beds at any [facility] facilities     [address] [facility] [address] [phone]   1040: Met w/ patient in room.  Advised of several referrals looking for a passport bed.  No beds at [facility], [facility], [facility] or [facility]. Advised a bed is available at [facility] and they accept her insurance.  Patient agreed to facility and Nickie was updated. Obtained report number and they can take today.  HARSHA Serna advised and will enter dc summary.  Faxed her 30 day expempt form to sign for facility.  Scripts signed and on chart.        Other: Other (pvt car)    Discharge Codes: 03  Discharged/transferred to skilled nursing facility (SNF) with Medicare certification in anticipation of skilled care

## 2020-07-06 ENCOUNTER — APPOINTMENT (OUTPATIENT)
Dept: GENERAL RADIOLOGY | Facility: HOSPITAL | Age: 35
End: 2020-07-06

## 2020-07-06 PROCEDURE — 99284 EMERGENCY DEPT VISIT MOD MDM: CPT

## 2020-07-06 PROCEDURE — 72110 X-RAY EXAM L-2 SPINE 4/>VWS: CPT

## 2020-07-07 ENCOUNTER — APPOINTMENT (OUTPATIENT)
Dept: MRI IMAGING | Facility: HOSPITAL | Age: 35
End: 2020-07-07

## 2020-07-07 ENCOUNTER — HOSPITAL ENCOUNTER (INPATIENT)
Facility: HOSPITAL | Age: 35
LOS: 2 days | Discharge: HOME | End: 2020-07-09
Attending: EMERGENCY MEDICINE | Admitting: HOSPITALIST

## 2020-07-07 DIAGNOSIS — M54.59 INTRACTABLE LOW BACK PAIN: Primary | ICD-10-CM

## 2020-07-07 DIAGNOSIS — M54.16 LUMBAR RADICULOPATHY: ICD-10-CM

## 2020-07-07 DIAGNOSIS — M51.26 LUMBAR DISC DISPLACEMENT WITHOUT MYELOPATHY: ICD-10-CM

## 2020-07-07 DIAGNOSIS — R53.1 GENERALIZED WEAKNESS: ICD-10-CM

## 2020-07-07 PROBLEM — D63.8 ANEMIA, CHRONIC DISEASE: Status: ACTIVE | Noted: 2020-07-07

## 2020-07-07 PROBLEM — E66.9 CLASS 2 OBESITY IN ADULT: Status: ACTIVE | Noted: 2020-07-07

## 2020-07-07 PROBLEM — E66.812 CLASS 2 OBESITY IN ADULT: Status: ACTIVE | Noted: 2020-07-07

## 2020-07-07 PROBLEM — R73.9 HYPERGLYCEMIA: Status: ACTIVE | Noted: 2020-07-07

## 2020-07-07 LAB
ALBUMIN SERPL-MCNC: 4.4 G/DL (ref 3.5–5.2)
ALBUMIN/GLOB SERPL: 2.1 G/DL
ALP SERPL-CCNC: 42 U/L (ref 39–117)
ALT SERPL W P-5'-P-CCNC: 12 U/L (ref 1–33)
ANION GAP SERPL CALCULATED.3IONS-SCNC: 8.9 MMOL/L (ref 5–15)
ANION GAP SERPL CALCULATED.3IONS-SCNC: 8.9 MMOL/L (ref 5–15)
AST SERPL-CCNC: 12 U/L (ref 1–32)
BASOPHILS # BLD AUTO: 0.03 10*3/MM3 (ref 0–0.2)
BASOPHILS NFR BLD AUTO: 0.5 % (ref 0–1.5)
BILIRUB SERPL-MCNC: 0.3 MG/DL (ref 0–1.2)
BUN SERPL-MCNC: 9 MG/DL (ref 6–20)
BUN SERPL-MCNC: 9 MG/DL (ref 6–20)
BUN/CREAT SERPL: 13.6 (ref 7–25)
BUN/CREAT SERPL: 13.6 (ref 7–25)
CALCIUM SPEC-SCNC: 9.3 MG/DL (ref 8.6–10.5)
CALCIUM SPEC-SCNC: 9.3 MG/DL (ref 8.6–10.5)
CHLORIDE SERPL-SCNC: 104 MMOL/L (ref 98–107)
CHLORIDE SERPL-SCNC: 104 MMOL/L (ref 98–107)
CO2 SERPL-SCNC: 26.1 MMOL/L (ref 22–29)
CO2 SERPL-SCNC: 26.1 MMOL/L (ref 22–29)
CREAT SERPL-MCNC: 0.66 MG/DL (ref 0.57–1)
CREAT SERPL-MCNC: 0.66 MG/DL (ref 0.57–1)
DEPRECATED RDW RBC AUTO: 41 FL (ref 37–54)
EOSINOPHIL # BLD AUTO: 0.03 10*3/MM3 (ref 0–0.4)
EOSINOPHIL NFR BLD AUTO: 0.5 % (ref 0.3–6.2)
ERYTHROCYTE [DISTWIDTH] IN BLOOD BY AUTOMATED COUNT: 13 % (ref 12.3–15.4)
GFR SERPL CREATININE-BSD FRML MDRD: 101 ML/MIN/1.73
GFR SERPL CREATININE-BSD FRML MDRD: 101 ML/MIN/1.73
GLOBULIN UR ELPH-MCNC: 2.1 GM/DL
GLUCOSE SERPL-MCNC: 113 MG/DL (ref 65–99)
GLUCOSE SERPL-MCNC: 113 MG/DL (ref 65–99)
HBA1C MFR BLD: 5.46 % (ref 4.8–5.6)
HCT VFR BLD AUTO: 34 % (ref 34–46.6)
HGB BLD-MCNC: 11.1 G/DL (ref 12–15.9)
IMM GRANULOCYTES # BLD AUTO: 0.02 10*3/MM3 (ref 0–0.05)
IMM GRANULOCYTES NFR BLD AUTO: 0.3 % (ref 0–0.5)
LYMPHOCYTES # BLD AUTO: 1.46 10*3/MM3 (ref 0.7–3.1)
LYMPHOCYTES NFR BLD AUTO: 24.5 % (ref 19.6–45.3)
MCH RBC QN AUTO: 27.6 PG (ref 26.6–33)
MCHC RBC AUTO-ENTMCNC: 32.6 G/DL (ref 31.5–35.7)
MCV RBC AUTO: 84.6 FL (ref 79–97)
MONOCYTES # BLD AUTO: 0.31 10*3/MM3 (ref 0.1–0.9)
MONOCYTES NFR BLD AUTO: 5.2 % (ref 5–12)
NEUTROPHILS NFR BLD AUTO: 4.11 10*3/MM3 (ref 1.7–7)
NEUTROPHILS NFR BLD AUTO: 69 % (ref 42.7–76)
NRBC BLD AUTO-RTO: 0 /100 WBC (ref 0–0.2)
PLATELET # BLD AUTO: 260 10*3/MM3 (ref 140–450)
PMV BLD AUTO: 10.3 FL (ref 6–12)
POTASSIUM SERPL-SCNC: 4.1 MMOL/L (ref 3.5–5.2)
POTASSIUM SERPL-SCNC: 4.1 MMOL/L (ref 3.5–5.2)
PROT SERPL-MCNC: 6.5 G/DL (ref 6–8.5)
RBC # BLD AUTO: 4.02 10*6/MM3 (ref 3.77–5.28)
SODIUM SERPL-SCNC: 139 MMOL/L (ref 136–145)
SODIUM SERPL-SCNC: 139 MMOL/L (ref 136–145)
WBC # BLD AUTO: 5.96 10*3/MM3 (ref 3.4–10.8)

## 2020-07-07 PROCEDURE — 85025 COMPLETE CBC W/AUTO DIFF WBC: CPT | Performed by: EMERGENCY MEDICINE

## 2020-07-07 PROCEDURE — 80053 COMPREHEN METABOLIC PANEL: CPT | Performed by: EMERGENCY MEDICINE

## 2020-07-07 PROCEDURE — 25010000002 ONDANSETRON PER 1 MG: Performed by: EMERGENCY MEDICINE

## 2020-07-07 PROCEDURE — 25010000002 METHYLPREDNISOLONE PER 125 MG: Performed by: EMERGENCY MEDICINE

## 2020-07-07 PROCEDURE — 25010000002 HYDROMORPHONE 1 MG/ML SOLUTION: Performed by: EMERGENCY MEDICINE

## 2020-07-07 PROCEDURE — 25010000002 HYDROMORPHONE PER 4 MG: Performed by: INTERNAL MEDICINE

## 2020-07-07 PROCEDURE — 97535 SELF CARE MNGMENT TRAINING: CPT

## 2020-07-07 PROCEDURE — 25010000002 LORAZEPAM PER 2 MG: Performed by: HOSPITALIST

## 2020-07-07 PROCEDURE — 0 GADOBENATE DIMEGLUMINE 529 MG/ML SOLUTION: Performed by: HOSPITALIST

## 2020-07-07 PROCEDURE — A9577 INJ MULTIHANCE: HCPCS | Performed by: HOSPITALIST

## 2020-07-07 PROCEDURE — 97166 OT EVAL MOD COMPLEX 45 MIN: CPT

## 2020-07-07 PROCEDURE — 83036 HEMOGLOBIN GLYCOSYLATED A1C: CPT | Performed by: HOSPITALIST

## 2020-07-07 PROCEDURE — 80048 BASIC METABOLIC PNL TOTAL CA: CPT | Performed by: NURSE PRACTITIONER

## 2020-07-07 PROCEDURE — 72158 MRI LUMBAR SPINE W/O & W/DYE: CPT

## 2020-07-07 RX ORDER — TIZANIDINE 4 MG/1
4 TABLET ORAL NIGHTLY PRN
Status: DISCONTINUED | OUTPATIENT
Start: 2020-07-07 | End: 2020-07-09 | Stop reason: HOSPADM

## 2020-07-07 RX ORDER — SODIUM CHLORIDE 0.9 % (FLUSH) 0.9 %
10 SYRINGE (ML) INJECTION AS NEEDED
Status: DISCONTINUED | OUTPATIENT
Start: 2020-07-07 | End: 2020-07-09 | Stop reason: HOSPADM

## 2020-07-07 RX ORDER — METHYLPREDNISOLONE SODIUM SUCCINATE 125 MG/2ML
125 INJECTION, POWDER, LYOPHILIZED, FOR SOLUTION INTRAMUSCULAR; INTRAVENOUS ONCE
Status: COMPLETED | OUTPATIENT
Start: 2020-07-07 | End: 2020-07-07

## 2020-07-07 RX ORDER — ONDANSETRON 2 MG/ML
4 INJECTION INTRAMUSCULAR; INTRAVENOUS ONCE
Status: COMPLETED | OUTPATIENT
Start: 2020-07-07 | End: 2020-07-07

## 2020-07-07 RX ORDER — ACETAMINOPHEN 325 MG/1
650 TABLET ORAL EVERY 4 HOURS PRN
Status: DISCONTINUED | OUTPATIENT
Start: 2020-07-07 | End: 2020-07-09 | Stop reason: HOSPADM

## 2020-07-07 RX ORDER — GABAPENTIN 300 MG/1
300 CAPSULE ORAL 3 TIMES DAILY
Status: DISCONTINUED | OUTPATIENT
Start: 2020-07-07 | End: 2020-07-09 | Stop reason: HOSPADM

## 2020-07-07 RX ORDER — BISACODYL 5 MG/1
5 TABLET, DELAYED RELEASE ORAL DAILY PRN
Status: DISCONTINUED | OUTPATIENT
Start: 2020-07-07 | End: 2020-07-09 | Stop reason: HOSPADM

## 2020-07-07 RX ORDER — HYDROMORPHONE HYDROCHLORIDE 1 MG/ML
0.5 INJECTION, SOLUTION INTRAMUSCULAR; INTRAVENOUS; SUBCUTANEOUS
Status: DISCONTINUED | OUTPATIENT
Start: 2020-07-07 | End: 2020-07-09 | Stop reason: HOSPADM

## 2020-07-07 RX ORDER — HYDROCODONE BITARTRATE AND ACETAMINOPHEN 7.5; 325 MG/1; MG/1
1 TABLET ORAL EVERY 4 HOURS PRN
Status: DISCONTINUED | OUTPATIENT
Start: 2020-07-07 | End: 2020-07-09 | Stop reason: HOSPADM

## 2020-07-07 RX ORDER — SODIUM CHLORIDE 9 MG/ML
75 INJECTION, SOLUTION INTRAVENOUS CONTINUOUS
Status: DISCONTINUED | OUTPATIENT
Start: 2020-07-07 | End: 2020-07-07

## 2020-07-07 RX ORDER — ACETAMINOPHEN 160 MG/5ML
650 SOLUTION ORAL EVERY 4 HOURS PRN
Status: DISCONTINUED | OUTPATIENT
Start: 2020-07-07 | End: 2020-07-09 | Stop reason: HOSPADM

## 2020-07-07 RX ORDER — TIZANIDINE 4 MG/1
4 TABLET ORAL NIGHTLY PRN
COMMUNITY

## 2020-07-07 RX ORDER — ONDANSETRON 2 MG/ML
4 INJECTION INTRAMUSCULAR; INTRAVENOUS EVERY 6 HOURS PRN
Status: DISCONTINUED | OUTPATIENT
Start: 2020-07-07 | End: 2020-07-09 | Stop reason: HOSPADM

## 2020-07-07 RX ORDER — SODIUM CHLORIDE 0.9 % (FLUSH) 0.9 %
10 SYRINGE (ML) INJECTION EVERY 12 HOURS SCHEDULED
Status: DISCONTINUED | OUTPATIENT
Start: 2020-07-07 | End: 2020-07-09 | Stop reason: HOSPADM

## 2020-07-07 RX ORDER — GABAPENTIN 300 MG/1
300 CAPSULE ORAL 3 TIMES DAILY
COMMUNITY

## 2020-07-07 RX ORDER — CALCIUM CARBONATE 200(500)MG
2 TABLET,CHEWABLE ORAL 2 TIMES DAILY PRN
Status: DISCONTINUED | OUTPATIENT
Start: 2020-07-07 | End: 2020-07-09 | Stop reason: HOSPADM

## 2020-07-07 RX ORDER — ACETAMINOPHEN 650 MG/1
650 SUPPOSITORY RECTAL EVERY 4 HOURS PRN
Status: DISCONTINUED | OUTPATIENT
Start: 2020-07-07 | End: 2020-07-09 | Stop reason: HOSPADM

## 2020-07-07 RX ORDER — LORAZEPAM 2 MG/ML
0.5 INJECTION INTRAMUSCULAR ONCE
Status: COMPLETED | OUTPATIENT
Start: 2020-07-07 | End: 2020-07-07

## 2020-07-07 RX ORDER — ONDANSETRON 4 MG/1
4 TABLET, FILM COATED ORAL EVERY 6 HOURS PRN
Status: DISCONTINUED | OUTPATIENT
Start: 2020-07-07 | End: 2020-07-09 | Stop reason: HOSPADM

## 2020-07-07 RX ADMIN — GABAPENTIN 300 MG: 300 CAPSULE ORAL at 12:09

## 2020-07-07 RX ADMIN — HYDROCODONE BITARTRATE AND ACETAMINOPHEN 1 TABLET: 7.5; 325 TABLET ORAL at 20:08

## 2020-07-07 RX ADMIN — METHYLPREDNISOLONE SODIUM SUCCINATE 125 MG: 125 INJECTION, POWDER, FOR SOLUTION INTRAMUSCULAR; INTRAVENOUS at 03:46

## 2020-07-07 RX ADMIN — LORAZEPAM 0.5 MG: 2 INJECTION INTRAMUSCULAR; INTRAVENOUS at 10:21

## 2020-07-07 RX ADMIN — GABAPENTIN 300 MG: 300 CAPSULE ORAL at 20:07

## 2020-07-07 RX ADMIN — ONDANSETRON 4 MG: 2 INJECTION INTRAMUSCULAR; INTRAVENOUS at 03:45

## 2020-07-07 RX ADMIN — TIZANIDINE 4 MG: 4 TABLET ORAL at 20:07

## 2020-07-07 RX ADMIN — HYDROMORPHONE HYDROCHLORIDE 1 MG: 1 INJECTION, SOLUTION INTRAMUSCULAR; INTRAVENOUS; SUBCUTANEOUS at 03:45

## 2020-07-07 RX ADMIN — HYDROCODONE BITARTRATE AND ACETAMINOPHEN 1 TABLET: 7.5; 325 TABLET ORAL at 14:14

## 2020-07-07 RX ADMIN — SODIUM CHLORIDE, PRESERVATIVE FREE 10 ML: 5 INJECTION INTRAVENOUS at 08:23

## 2020-07-07 RX ADMIN — GABAPENTIN 300 MG: 300 CAPSULE ORAL at 16:03

## 2020-07-07 RX ADMIN — SODIUM CHLORIDE 75 ML/HR: 9 INJECTION, SOLUTION INTRAVENOUS at 03:50

## 2020-07-07 RX ADMIN — HYDROMORPHONE HYDROCHLORIDE 0.5 MG: 1 INJECTION, SOLUTION INTRAMUSCULAR; INTRAVENOUS; SUBCUTANEOUS at 08:23

## 2020-07-07 RX ADMIN — SODIUM CHLORIDE, PRESERVATIVE FREE 10 ML: 5 INJECTION INTRAVENOUS at 20:09

## 2020-07-07 RX ADMIN — GADOBENATE DIMEGLUMINE 20 ML: 529 INJECTION, SOLUTION INTRAVENOUS at 11:33

## 2020-07-07 RX ADMIN — HYDROMORPHONE HYDROCHLORIDE 0.5 MG: 1 INJECTION, SOLUTION INTRAMUSCULAR; INTRAVENOUS; SUBCUTANEOUS at 06:01

## 2020-07-07 RX ADMIN — HYDROMORPHONE HYDROCHLORIDE 0.5 MG: 1 INJECTION, SOLUTION INTRAMUSCULAR; INTRAVENOUS; SUBCUTANEOUS at 16:03

## 2020-07-07 RX ADMIN — HYDROMORPHONE HYDROCHLORIDE 0.5 MG: 1 INJECTION, SOLUTION INTRAMUSCULAR; INTRAVENOUS; SUBCUTANEOUS at 12:09

## 2020-07-07 NOTE — PLAN OF CARE
Problem: Patient Care Overview  Goal: Plan of Care Review  Outcome: Ongoing (interventions implemented as appropriate)  Flowsheets (Taken 7/7/2020 0415)  Plan of Care Reviewed With: patient; family  Outcome Summary: Pt 34 yo female with past history of a L5-S1 fusion in 2017 who presented with acute onset of back pain.  Pt presents to OT with extreme back pain.  Pt perform bed mobility with CGA, CGA for functional mobility and supervision for toileting and toilet transfer.  Pt unable to don socks at this time  Stand at sink with Supervision to wash hands. .  Pt will benefit from skilled OT to address deficits and educate pt on safe ways to perform ADLs.  Anticipate home with assist as needed.       Patient was not wearing a face mask during this therapy encounter. Therapist used appropriate personal protective equipment including face shield, mask and gloves.  Mask used was standard procedure mask. Appropriate PPE was worn during the entire therapy session. Hand hygiene was completed before and after therapy session. Patient is not in enhanced droplet precautions.

## 2020-07-07 NOTE — ED TRIAGE NOTES
Pt to ER via PV with c/o back pain. Pt states she woke up Sunday morning and was unable to ambulate, rates pain 10/10. Pt has hx of multiple back surgeries and states she thinks a disc is out of place. Pt was advised by her MD to come to ER for evaluation.     Pt masked in triage, staff in appropriate ppe.

## 2020-07-07 NOTE — PLAN OF CARE
Problem: Patient Care Overview  Goal: Plan of Care Review  Outcome: Ongoing (interventions implemented as appropriate)  Flowsheets (Taken 7/7/2020 0076)  Progress: no change  Plan of Care Reviewed With: patient  Outcome Summary: Severe back pain, awaiting MRI

## 2020-07-07 NOTE — CONSULTS
Patient Care Team:  Jonh Thornton MD as PCP - General (Internal Medicine)    Chief complaint: Back pain    Subjective     Patient is a 35 y.o. female presents with intractable back pain starting Sunday morning.  [place] without any history of trauma.  The pain is located in the back and travels down both legs into the thighs.  This feels similar to her pain prior to surgery. She had a minimally invasive TLIF in 2017 at L5-S1 which was very successful.  She started taking pain medication at home prescribed by her pain management doctor without relief.  She presented to the emergency room as noted for tactile pain.  Review of Systems   Pertinent items are noted in HPI    History  Past Medical History:   Diagnosis Date   • Depression with anxiety     SITUATION   • Genital herpes    • Heart murmur     BENIGN   • Injury of back    • Lumbar disc disease    • Tingling     LEFT LEG FROM BACK TO FOOT     Past Surgical History:   Procedure Laterality Date   • ABDOMINAL SURGERY      BENIGN TUMOR REMOVED   • BREAST AUGMENTATION     • LUMBAR FUSION N/A 9/15/2017    Procedure: MINIMINALLY INVASIVE SPINAL TRANSFORMINAL LUMBAR INTERBODY FUSION L5-S1;  Surgeon: Ten Johns DO;  Location: [facility];  Service:    • WISDOM TOOTH EXTRACTION       Family History   Problem Relation Age of Onset   • Malig Hyperthermia Neg Hx      Social History     Tobacco Use   • Smoking status: Former Smoker     Packs/day: 0.50     Types: Cigarettes   • Smokeless tobacco: Never Used   • Tobacco comment: SOCIAL. NONE IN PAST 3 WEEKS   Substance Use Topics   • Alcohol use: Yes     Comment: every other week   • Drug use: No     Medications Prior to Admission   Medication Sig Dispense Refill Last Dose   • gabapentin (NEURONTIN) 300 MG capsule Take 300 mg by mouth 3 (Three) Times a Day.      • tiZANidine (ZANAFLEX) 4 MG tablet Take 4 mg by mouth At Night As Needed for Muscle Spasms.       • ibuprofen (ADVIL,MOTRIN) 200 MG tablet Take 800 mg by mouth Every 6 (Six) Hours As Needed for Mild Pain . WILL HOLD FOR SURGERY.   9/11/2017   • oxyCODONE-acetaminophen (PERCOCET) 7.5-325 MG per tablet Take 1 tablet by mouth 2 (Two) Times a Day As Needed for Moderate Pain .   9/14/2017 at 1900     Allergies:  Patient has no known allergies.    Objective     Vital Signs  Temp:  [97.5 °F (36.4 °C)-98.9 °F (37.2 °C)] 97.5 °F (36.4 °C)  Heart Rate:  [66-93] 66  Resp:  [16-18] 16  BP: (100-133)/(64-90) 133/90    Physical Exam:    General Appearance: alert, appears stated age and cooperative  Back: tenderness to palpation  Extremities: moves extremities well, no edema, no cyanosis and no redness  Neurologic: Mental Status orientated to person, place, time and situation, Cranial Nerves cranial nerves 2 - 12 grossly intact as examined, Motor strength is equal in upper and lower extremities  Psych: normal                                                            Results Review:    I reviewed the patient's new clinical results.  4 view x-rays of lumbar spine show a successful fusion at L5-S1 without any evidence of hardware complication.  I disagree with the radiology report indicating increasing spondylolisthesis.  I compared her intraoperative photos of the surgery compared to yesterday's x-rays with no change.  Assessment/Plan       Intractable low back pain      MRI lumbar spine today.  After reviewing MRI will give further recommendations.  She may benefit from a epidural steroid injection.    I discussed the patients findings and my recommendations with patient.     Ten Johns DO  07/07/20  09:51

## 2020-07-07 NOTE — H&P
"    Patient Name:  Pretty Israel  YOB: 1985  MRN:  [id number]  Admit Date:  7/7/2020  Patient Care Team:  Jonh Thornton MD as PCP - General (Internal Medicine)      Subjective   History Present Illness     Chief Complaint   Patient presents with   • Back Pain       Ms. Israel is a 35 y.o. non-smoker with a history of obesity, lumbar disc disease status post lumbar L5-S1 fusion, heart murmur, depression anxiety, status post breast augmentation who presented to [facility] ER with complaints of low back pain and admitted for intractable low back pain.    Patient reports previous back injury to 2017 with subsequent lumbar fusion and reportedly ws doing well status post surgery back pain; therefore, started gym membership in March with short hiatus secondary to COVID 19 pandemic--resumed light exercise in early June to include walking / elliptical and denies weight lifting for approximate total 5 recent gym visits.  [place] however, reports cautious and denies recent injury to back or slip, trip, fall.      Patient states awoke on Sunday morning with acute low back pain and attempted to remedy pain with prescribed gabapentin, Percocet, tizanidine, and back brace for support which provided some relief; however, is struggling to sit and stand.  Patient states low back pain is \"catching\" and \"takes my breath away\".  Patient is also holding furniture while ambulating secondary to reported bilateral lower extremity weakness/numbness/pain; therefore, went to [facility] ER for further evaluation.    Patient denies recent saddle anesthesia, epidural, pregnancy, bowel or bladder dysfunction.    In ER, AVSS on room air, labs unremarkable, x-ray lumbar spine impression no evidence of recent or old fracture, bilateral spondylosis is noted at level L5-S1.    Further recommendations per hospital course.  See additional details below in assessment/plan.    History of " Present Illness  Review of Systems   Constitutional: Negative for chills and fever.   HENT: Negative for congestion and rhinorrhea.    Eyes: Negative for visual disturbance.   Respiratory: Negative for cough and shortness of breath.    Cardiovascular: Negative for chest pain and leg swelling.   Gastrointestinal: Negative for abdominal pain, constipation, diarrhea, nausea and vomiting.   Endocrine: Negative for polydipsia, polyphagia and polyuria.   Genitourinary: Negative for difficulty urinating and dysuria.   Musculoskeletal: Positive for gait problem (2/2 mid low back pain). Negative for back pain (mid low back pain), myalgias and neck pain.   Skin: Negative for rash and wound.   Neurological: Positive for weakness (BLE) and numbness (BLE). Negative for dizziness, tremors, seizures, syncope, facial asymmetry, speech difficulty, light-headedness and headaches.   Psychiatric/Behavioral: Negative for confusion and hallucinations.        Personal History     Past Medical History:   Diagnosis Date   • Depression with anxiety     SITUATION   • Genital herpes    • Heart murmur     BENIGN   • Injury of back    • Lumbar disc disease    • Tingling     LEFT LEG FROM BACK TO FOOT     Past Surgical History:   Procedure Laterality Date   • ABDOMINAL SURGERY      BENIGN TUMOR REMOVED   • BREAST AUGMENTATION     • LUMBAR FUSION N/A 9/15/2017    Procedure: MINIMINALLY INVASIVE SPINAL TRANSFORMINAL LUMBAR INTERBODY FUSION L5-S1;  Surgeon: Ten Johns DO;  Location: [facility];  Service:    • WISDOM TOOTH EXTRACTION       Family History   Problem Relation Age of Onset   • Malig Hyperthermia Neg Hx      Social History     Tobacco Use   • Smoking status: Former Smoker     Packs/day: 0.50     Types: Cigarettes   • Smokeless tobacco: Never Used   • Tobacco comment: SOCIAL. NONE IN PAST 3 WEEKS   Substance Use Topics   • Alcohol use: Yes     Comment: every other week   • Drug use: No     No current facility-administered medications  on file prior to encounter.      Current Outpatient Medications on File Prior to Encounter   Medication Sig Dispense Refill   • gabapentin (NEURONTIN) 300 MG capsule Take 300 mg by mouth 3 (Three) Times a Day.     • tiZANidine (ZANAFLEX) 4 MG tablet Take 4 mg by mouth At Night As Needed for Muscle Spasms.     • ibuprofen (ADVIL,MOTRIN) 200 MG tablet Take 800 mg by mouth Every 6 (Six) Hours As Needed for Mild Pain . WILL HOLD FOR SURGERY.     • oxyCODONE-acetaminophen (PERCOCET) 7.5-325 MG per tablet Take 1 tablet by mouth 2 (Two) Times a Day As Needed for Moderate Pain .       No Known Allergies    Objective    Objective     Vital Signs  Temp:  [97.5 °F (36.4 °C)-98.9 °F (37.2 °C)] 97.5 °F (36.4 °C)  Heart Rate:  [66-93] 66  Resp:  [16-18] 16  BP: (100-133)/(64-90) 133/90  SpO2:  [96 %-98 %] 98 %  on   ;   Device (Oxygen Therapy): room air  Body mass index is 35.61 kg/m².    Physical Exam   Constitutional: She is oriented to person, place, and time. No distress.   Obese & non-toxic appearance   HENT:   Head: Normocephalic and atraumatic.   Eyes: Pupils are equal, round, and reactive to light. EOM are normal.   Neck: Normal range of motion. Neck supple.   Cardiovascular: Normal rate.   Murmur heard.  Pulmonary/Chest: Effort normal and breath sounds normal. No respiratory distress.   Abdominal: Soft. Bowel sounds are normal. She exhibits no distension. There is no tenderness.   Musculoskeletal: She exhibits no edema or deformity.   Neurological: She is alert and oriented to person, place, and time. No cranial nerve deficit or sensory deficit. She exhibits normal muscle tone. Coordination normal.   Skin: Skin is warm and dry. She is not diaphoretic.   Psychiatric: She has a normal mood and affect. Her behavior is normal. Judgment and thought content normal.       Results Review:  I reviewed the patient's new clinical results.  I reviewed the patient's new imaging results and agree with the interpretation.  I reviewed  the patient's other test results and agree with the interpretation  I personally viewed and interpreted the patient's EKG/Telemetry data  Discussed with ED provider.    Lab Results (last 24 hours)     Procedure Component Value Units Date/Time    CBC & Differential [[id number]] Collected:  07/07/20 0323    Specimen:  Blood Updated:  07/07/20 0336    Narrative:       The following orders were created for panel order CBC & Differential.  Procedure                               Abnormality         Status                     ---------                               -----------         ------                     CBC Auto Differential[[id number]]        Abnormal            Final result                 Please view results for these tests on the individual orders.    Comprehensive Metabolic Panel [[id number]]  (Abnormal) Collected:  07/07/20 0323    Specimen:  Blood Updated:  07/07/20 0355     Glucose 113 mg/dL      BUN 9 mg/dL      Creatinine 0.66 mg/dL      Sodium 139 mmol/L      Potassium 4.1 mmol/L      Chloride 104 mmol/L      CO2 26.1 mmol/L      Calcium 9.3 mg/dL      Total Protein 6.5 g/dL      Albumin 4.40 g/dL      ALT (SGPT) 12 U/L      AST (SGOT) 12 U/L      Alkaline Phosphatase 42 U/L      Total Bilirubin 0.3 mg/dL      eGFR Non African Amer 101 mL/min/1.73      Globulin 2.1 gm/dL      A/G Ratio 2.1 g/dL      BUN/Creatinine Ratio 13.6     Anion Gap 8.9 mmol/L     Narrative:       GFR Normal >60  Chronic Kidney Disease <60  Kidney Failure <15      CBC Auto Differential [[id number]]  (Abnormal) Collected:  07/07/20 0323    Specimen:  Blood Updated:  07/07/20 0336     WBC 5.96 10*3/mm3      RBC 4.02 10*6/mm3      Hemoglobin 11.1 g/dL      Hematocrit 34.0 %      MCV 84.6 fL      MCH 27.6 pg      MCHC 32.6 g/dL      RDW 13.0 %      RDW-SD 41.0 fl      MPV 10.3 fL      Platelets 260 10*3/mm3      Neutrophil % 69.0 %      Lymphocyte % 24.5 %      Monocyte % 5.2 %      Eosinophil % 0.5 %      Basophil % 0.5 %      Immature  Grans % 0.3 %      Neutrophils, Absolute 4.11 10*3/mm3      Lymphocytes, Absolute 1.46 10*3/mm3      Monocytes, Absolute 0.31 10*3/mm3      Eosinophils, Absolute 0.03 10*3/mm3      Basophils, Absolute 0.03 10*3/mm3      Immature Grans, Absolute 0.02 10*3/mm3      nRBC 0.0 /100 WBC     Basic Metabolic Panel [[id number]]  (Abnormal) Collected:  07/07/20 0323    Specimen:  Blood Updated:  07/07/20 0404     Glucose 113 mg/dL      BUN 9 mg/dL      Creatinine 0.66 mg/dL      Sodium 139 mmol/L      Potassium 4.1 mmol/L      Chloride 104 mmol/L      CO2 26.1 mmol/L      Calcium 9.3 mg/dL      eGFR Non African Amer 101 mL/min/1.73      BUN/Creatinine Ratio 13.6     Anion Gap 8.9 mmol/L     Narrative:       GFR Normal >60  Chronic Kidney Disease <60  Kidney Failure <15            Imaging Results (Last 24 Hours)     Procedure Component Value Units Date/Time    XR Spine Lumbar Complete 4+VW [[id number]] Collected:  07/07/20 0003     Updated:  07/07/20 0011    Narrative:       LUMBAR SPINE SERIES     CLINICAL HISTORY: Back pain     Compared to the previous lumbar spine MRI dated 08/31/2017.     Pedicle rods and screws are in place bilaterally at the L5 and S1  levels. Markers for graft material are also evident within the L5-S1  disc space which appears mildly narrowed. There is anterolisthesis of L5  with respect to S1 by approximately 1.5 cm it appears slightly more  prominent than on the previous MRI study. Bilateral spondylolysis is  also noted at this level. The remainder the lumbar disc spaces are  unremarkable. There is no evidence of recent or old fracture     This report was finalized on 7/7/2020 12:08 AM by Dr. Antoine Tom M.D.                  No orders to display        Assessment/Plan     Active Hospital Problems    Diagnosis  POA   • **Intractable low back pain [M54.5]  Yes   • Class 2 obesity in adult [E66.9]  Yes   • Anemia, chronic disease [D63.8]  Yes   • Hyperglycemia [R73.9]  Yes      Resolved Hospital  Problems   No resolved problems to display.       Ms. Israel is a 35 y.o. former smoker with a history of obesity, lumbar disc disease status post lumbar fusion, heart murmur, depression anxiety, status post breast augmentation who presented to [facility] ER with complaints of low back pain and admitted for intractable low back pain.      Intractable low back pain  -BIPIN following.  MRI lumbar spine w/wo pending.  X-ray lumbar spine unremarkable for recent or old fracture, noted bilateral spondylolysis noted at level L5-S1.  Analgesic PRN, continuous pulse oximetry.  Will likely need PT/OT consult pending BIPIN recommendation / ?  Surgical requirement.    Class 2 obesity in adult / hyperglycemia  -Recommend portion control.  Ordering hemoglobin A1c in a.m.    Anemia, chronic disease  -Mild anemia.  No evidence of active bleeding.  Continue to monitor labs for changes.    · I discussed the patient's findings and my recommendations with Dr. Minor.    VTE Prophylaxis - SCDs  Code Status - CPR       BHUMI Covarrubias  [facility]  07/07/20  10:40

## 2020-07-07 NOTE — ED NOTES
"Nursing report ED to floor  Pretty Israel  35 y.o.  female    HPI (triage note):   Chief Complaint   Patient presents with   • Back Pain       Admitting doctor:   Jevon Mooney MD    Admitting diagnosis:   The primary encounter diagnosis was Intractable low back pain. A diagnosis of Lumbar radiculopathy was also pertinent to this visit.    Code status:   Current Code Status     Date Active Code Status Order ID Comments User Context       7/7/2020 0314 CPR [id number]  Maya Jarrett APRN ED       Questions for Current Code Status     Question Answer Comment    Code Status CPR     Medical Interventions (Level of Support Prior to Arrest) Full           Allergies:   Patient has no known allergies.    Weight:       07/07/20 0310   Weight: 90.7 kg (200 lb)       Most recent vitals:   Vitals:    07/06/20 2058 07/07/20 0310 07/07/20 0419   BP:  112/69 100/64   BP Location:  Right arm    Patient Position:  Lying    Pulse: 93 70    Resp: 18 18    Temp: 98.9 °F (37.2 °C)     TempSrc: Tympanic     SpO2: 98% 98%    Weight:  90.7 kg (200 lb)    Height: 167.6 cm (66\") 167.6 cm (65.98\")        Active LDAs/IV Access:   Lines, Drains & Airways    Active LDAs     Name:   Placement date:   Placement time:   Site:   Days:    Peripheral IV 07/07/20 0324 Left Forearm   07/07/20 0324    Forearm   less than 1                Labs (abnormal labs have a star):   Labs Reviewed   COMPREHENSIVE METABOLIC PANEL - Abnormal; Notable for the following components:       Result Value    Glucose 113 (*)     All other components within normal limits    Narrative:     GFR Normal >60  Chronic Kidney Disease <60  Kidney Failure <15     CBC WITH AUTO DIFFERENTIAL - Abnormal; Notable for the following components:    Hemoglobin 11.1 (*)     All other components within normal limits   BASIC METABOLIC PANEL - Abnormal; Notable for the following components:    Glucose 113 (*)     All other components within normal limits    Narrative:     GFR " Normal >60  Chronic Kidney Disease <60  Kidney Failure <15     CBC AND DIFFERENTIAL    Narrative:     The following orders were created for panel order CBC & Differential.  Procedure                               Abnormality         Status                     ---------                               -----------         ------                     CBC Auto Differential[[id number]]        Abnormal            Final result                 Please view results for these tests on the individual orders.       EKG:   No orders to display       Meds given in ED:   Medications   sodium chloride 0.9 % flush 10 mL (has no administration in time range)   sodium chloride 0.9 % flush 10 mL (has no administration in time range)   sodium chloride 0.9 % flush 10 mL (has no administration in time range)   sodium chloride 0.9 % infusion (75 mL/hr Intravenous New Bag 7/7/20 0350)   acetaminophen (TYLENOL) tablet 650 mg (has no administration in time range)     Or   acetaminophen (TYLENOL) 160 MG/5ML solution 650 mg (has no administration in time range)     Or   acetaminophen (TYLENOL) suppository 650 mg (has no administration in time range)   bisacodyl (DULCOLAX) EC tablet 5 mg (has no administration in time range)   ondansetron (ZOFRAN) tablet 4 mg (has no administration in time range)     Or   ondansetron (ZOFRAN) injection 4 mg (has no administration in time range)   calcium carbonate (TUMS) chewable tablet 500 mg (200 mg elemental) (has no administration in time range)   HYDROmorphone (DILAUDID) injection 0.5 mg (has no administration in time range)   HYDROmorphone (DILAUDID) injection 1 mg (1 mg Intravenous Given 7/7/20 0345)   ondansetron (ZOFRAN) injection 4 mg (4 mg Intravenous Given 7/7/20 0345)   methylPREDNISolone sodium succinate (SOLU-Medrol) injection 125 mg (125 mg Intravenous Given 7/7/20 0346)       Imaging results:  No radiology results for the last day    Ambulatory status:   - assist    Social issues:   Social History      Socioeconomic History   • Marital status:      Spouse name: Not on file   • Number of children: Not on file   • Years of education: Not on file   • Highest education level: Not on file   Tobacco Use   • Smoking status: Former Smoker     Packs/day: 0.50     Types: Cigarettes   • Smokeless tobacco: Never Used   • Tobacco comment: SOCIAL. NONE IN PAST 3 WEEKS   Substance and Sexual Activity   • Alcohol use: Yes     Comment: every other week   • Drug use: No        Chery Nj RN  07/07/20 0517

## 2020-07-07 NOTE — THERAPY EVALUATION
"Acute Care - Occupational Therapy Initial Evaluation  [facility]     Patient Name: Pretty Israel  : 1985  MRN: [id number]  Today's Date: 2020             Admit Date: 2020       ICD-10-CM ICD-9-CM   1. Intractable low back pain M54.5 724.2   2. Lumbar radiculopathy M54.16 724.4     Patient Active Problem List   Diagnosis   • Acute midline low back pain with left-sided sciatica   • Tobacco abuse   • Chronic tachycardia   • Intractable low back pain   • Class 2 obesity in adult   • Anemia, chronic disease   • Hyperglycemia     Past Medical History:   Diagnosis Date   • Depression with anxiety     SITUATION   • Genital herpes    • Heart murmur     BENIGN   • Injury of back    • Lumbar disc disease    • Tingling     LEFT LEG FROM BACK TO FOOT     Past Surgical History:   Procedure Laterality Date   • ABDOMINAL SURGERY      BENIGN TUMOR REMOVED   • BREAST AUGMENTATION     • LUMBAR FUSION N/A 9/15/2017    Procedure: MINIMINALLY INVASIVE SPINAL TRANSFORMINAL LUMBAR INTERBODY FUSION L5-S1;  Surgeon: Ten Johns DO;  Location: [facility];  Service:    • WISDOM TOOTH EXTRACTION            OT ASSESSMENT FLOWSHEET (last 12 hours)      Occupational Therapy Evaluation     Row Name 20 1411                   OT Evaluation Time/Intention    Subjective Information  complains of;pain  -SK        Document Type  evaluation  -SK        Mode of Treatment  individual therapy;occupational therapy  -SK        Patient Effort  excellent  -SK        Symptoms Noted During/After Treatment  increased pain  -SK        Comment  pt wearing back support brace she brought from home to assist with the pain  -SK           General Information    Patient Profile Reviewed?  yes  -SK        Patient Observations  alert;agree to therapy;cooperative  -SK        Patient/Family Observations  \"i had sx in 2017 for my back and no issues since then until .\"  -SK        General Observations of Patient  supine in bed no signs of " distress noted   -SK        Prior Level of Function  independent:  -SK        Equipment Currently Used at Home  none  -SK        Existing Precautions/Restrictions  fall  -SK           Cognitive Assessment/Intervention- PT/OT    Orientation Status (Cognition)  oriented x 4  -SK        Follows Commands (Cognition)  WNL  -SK           Safety Issues, Functional Mobility    Impairments Affecting Function (Mobility)  pain;balance  -SK        Comment, Safety Issues/Impairments (Mobility)  gait belt and non slip socks used   -SK           Bed Mobility Assessment/Treatment    Bed Mobility Assessment/Treatment  supine-sit;sit-supine  -SK        Supine-Sit [place] (Bed Mobility)  contact guard;verbal cues  -SK        Sit-Supine [place] (Bed Mobility)  contact guard;verbal cues  -SK        Assistive Device (Bed Mobility)  bed rails  -SK        Comment (Bed Mobility)  pt use log roll to get in and out of bed   -SK           Functional Mobility    Functional Mobility- Ind. Level  contact guard assist  -SK        Functional Mobility- Device  -- no AD   -SK        Functional Mobility-Distance (Feet)  20  -SK        Functional Mobility- Comment  pain subsided a bit when walking   -SK           Transfer Assessment/Treatment    Transfer Assessment/Treatment  sit-stand transfer;stand-sit transfer;toilet transfer  -SK           Sit-Stand Transfer    Sit-Stand [place] (Transfers)  contact guard  -SK           Stand-Sit Transfer    Stand-Sit [place] (Transfers)  contact guard  -SK           Toilet Transfer    Type (Toilet Transfer)  stand-sit;sit-stand  -SK        [place] Level (Toilet Transfer)  contact guard;verbal cues  -SK        Assistive Device (Toilet Transfer)  commode;grab bars/safety frame  -SK           ADL Assessment/Intervention    BADL Assessment/Intervention  lower body dressing;grooming;toileting  -SK           Lower Body Dressing Assessment/Training    Lower Body Dressing [place] Level   don;socks;dependent (less than 25% patient effort)  -SK        Lower Body Dressing Position  edge of bed sitting  -SK        Comment (Lower Body Dressing)  due to increased pain, unable to bend  -SK           Grooming Assessment/Training    [place] Level (Grooming)  grooming skills;wash face, hands;oral care regimen;set up;supervision  -SK        Grooming Position  sink side;supported standing;unsupported standing  -SK        Comment (Grooming)  using hands to steady self at counter   -SK           Toileting Assessment/Training    [place] Level (Toileting)  toileting skills;adjust/manage clothing;perform perineal hygiene;contact guard assist  -SK        Assistive Devices (Toileting)  commode;grab bar/safety frame  -SK        Toileting Position  unsupported sitting;unsupported standing  -SK           General ROM    GENERAL ROM COMMENTS  BUE WFL   -SK           MMT (Manual Muscle Testing)    General MMT Comments  Grossly 4/5   -SK           Motor Assessment/Interventions    Additional Documentation  Balance (Group);Balance Interventions (Group);Therapeutic Exercise (Group);Therapeutic Exercise Interventions (Group)  -SK           Balance    Balance  static sitting balance;static standing balance  -SK           Static Sitting Balance    Level of [place] (Unsupported Sitting, Static Balance)  supervision  -SK        Sitting Position (Unsupported Sitting, Static Balance)  sitting on edge of bed  -SK        Time Able to Maintain Position (Unsupported Sitting, Static Balance)  2 to 3 minutes  -SK           Static Standing Balance    Level of [place] (Supported Standing, Static Balance)  contact guard assist  -SK        Time Able to Maintain Position (Supported Standing, Static Balance)  2 to 3 minutes  -SK        Comment (Supported Standing, Static Balance)  pain limits pt standing   -SK           Sensory Assessment/Intervention    Sensory General Assessment  no sensation deficits identified  -SK            Positioning and Restraints    Pre-Treatment Position  in bed  -SK        Post Treatment Position  bed  -SK        In Bed  encouraged to call for assist;with family/caregiver;with nsg;call light within reach  -SK           Pain Assessment    Additional Documentation  Pain Scale: Numbers Pre/Post-Treatment (Group)  -SK           Pain Scale: Numbers Pre/Post-Treatment    Pain Scale: Numbers, Pretreatment  8/10  -SK        Pain Scale: Numbers, Post-Treatment  7/10  -SK        Pain Location - Side  Bilateral  -SK        Pain Location - Orientation  distal  -SK        Pain Location  back  -SK        Pre/Post Treatment Pain Comment  increased lower back pain  -SK        Pain Intervention(s)  Medication (See MAR);Repositioned;Rest  -SK           Plan of Care Review    Plan of Care Reviewed With  patient;family  -SK           Clinical Impression (OT)    OT Diagnosis  Pt presents to OT with extreme back pain limiting ADL activity.    -SK        Functional Level at Time of Evaluation (OT Eval)  Pt perform bed mobility with CGA, CGA for functional mobility and supervision for toileting and toilet transfer.  Pt unable to don socks at this time  Stand at sink with Supervision to wash hands  -SK        Patient/Family Goals Statement (OT Eval)  return home and decreased pain with activity   -SK        Criteria for Skilled Therapeutic Interventions Met (OT Eval)  yes  -SK        Rehab Potential (OT Eval)  good, to achieve stated therapy goals  -SK        Therapy Frequency (OT Eval)  3 times/wk  -SK        Care Plan Review (OT)  evaluation/treatment results reviewed;care plan/treatment goals reviewed;patient/other agree to care plan  -SK           Planned OT Interventions    Planned Therapy Interventions (OT Eval)  activity tolerance training;BADL retraining;ROM/therapeutic exercise;strengthening exercise  -SK           OT Goals    Bathing Goal Selection (OT)  bathing, OT goal 1  -SK        Toileting Goal Selection (OT)   toileting, OT goal 1  -SK           Bathing Goal 1 (OT)    Activity/Assistive Device (Bathing Goal 1, OT)  bathing skills, all;upper body bathing;lower body bathing  -SK        [place] Level/Cues Needed (Bathing Goal 1, OT)  supervision required  -SK        Time Frame (Bathing Goal 1, OT)  2 weeks  -SK        Progress/Outcomes (Bathing Goal 1, OT)  goal ongoing  -SK           Toileting Goal 1 (OT)    Activity/Device (Toileting Goal 1, OT)  toileting skills, all;adjust/manage clothing;perform perineal hygiene;commode;grab bar/safety frame  -SK        [place] Level/Cues Needed (Toileting Goal 1, OT)  conditional independence  -SK        Time Frame (Toileting Goal 1, OT)  2 weeks  -SK        Progress/Outcome (Toileting Goal 1, OT)  goal ongoing  -SK          User Key  (r) = Recorded By, (t) = Taken By, (c) = Cosigned By    Initials Name Effective Dates    SK Tracy Glover, OT 02/25/19 -                OT Recommendation and Plan  Outcome Summary/Treatment Plan (OT)  Anticipated Discharge Disposition (OT): home with assist  Planned Therapy Interventions (OT Eval): activity tolerance training, BADL retraining, ROM/therapeutic exercise, strengthening exercise  Therapy Frequency (OT Eval): 3 times/wk  Plan of Care Review  Plan of Care Reviewed With: patient, family  Plan of Care Reviewed With: patient, family  Outcome Summary: Pt 34 yo female with past history of a L5-S1 fusion in 2017 who presented with acute onset of back pain.  Pt presents to OT with extreme back pain.  Pt perform bed mobility with CGA, CGA for functional mobility and supervision for toileting and toilet transfer.  Pt unable to don socks at this time  Stand at sink with Supervision to wash hands. .  Pt will benefit from skilled OT to address deficits and educate pt on safe ways to perform ADLs.  Anticipate home with assist as needed.    Outcome Measures     Row Name 07/07/20 1500             How much help from another is currently needed...     Putting on and taking off regular lower body clothing?  2  -SK      Bathing (including washing, rinsing, and drying)  3  -SK      Toileting (which includes using toilet bed pan or urinal)  3  -SK      Putting on and taking off regular upper body clothing  3  -SK      Taking care of personal grooming (such as brushing teeth)  3  -SK      Eating meals  4  -SK      AM-PAC 6 Clicks Score (OT)  18  -SK         Functional Assessment    Outcome Measure Options  AM-PAC 6 Clicks Daily Activity (OT)  -SK        User Key  (r) = Recorded By, (t) = Taken By, (c) = Cosigned By    Initials Name Provider Type    Tracy Molina OT Occupational Therapist          Time Calculation:   Time Calculation- OT     Row Name 07/07/20 1529             Time Calculation- OT    OT Start Time  1351  -SK      OT Stop Time  1411  -SK      OT Time Calculation (min)  20 min  -SK      Total Timed Code Minutes- OT  15 minute(s)  -SK      OT Received On  07/07/20  -SK      OT - Next Appointment  07/09/20  -SK      OT Goal Re-Cert Due Date  07/21/20  -SK        User Key  (r) = Recorded By, (t) = Taken By, (c) = Cosigned By    Initials Name Provider Type    Tracy Molina OT Occupational Therapist        Therapy Charges for Today     Code Description Service Date Service Provider Modifiers Qty    [id number]  OT EVAL MOD COMPLEXITY 2 7/7/2020 Tracy Glover OT GO 1    [id number]  OT SELF CARE/MGMT/TRAIN EA 15 MIN 7/7/2020 Tracy Glover OT GO 1               Tracy Glover OT  7/7/2020

## 2020-07-07 NOTE — PLAN OF CARE
Problem: Patient Care Overview  Goal: Plan of Care Review  Outcome: Ongoing (interventions implemented as appropriate)  Flowsheets (Taken 7/7/2020 1938)  Progress: no change  Plan of Care Reviewed With: patient; significant other  Outcome Summary: Patient up to bedside commode with stand-by assistance. Dilaudid x3 and Norco x1 given for pain relief. Patient went down for MRI and she is awaiting for MD to deliver results. Will continue to monitor.

## 2020-07-07 NOTE — PLAN OF CARE
Pt in bed resting comfortably, denies any needs at this time. Pt verbalizes understanding of call light use.

## 2020-07-07 NOTE — PROGRESS NOTES
Discharge Planning Assessment  [facility]     Patient Name: Pretty Israel  MRN: [id number]  Today's Date: 7/7/2020    Admit Date: 7/7/2020    Discharge Needs Assessment     Row Name 07/07/20 2135       Living Environment    Lives With  child(osmani), dependent    Current Living Arrangements  home/apartment/condo    Potentially Unsafe Housing Conditions  other (see comments) no concerns     Primary Care Provided by  self    Provides Primary Care For  child(osmani)    Family Caregiver if Needed  significant other    Quality of Family Relationships  helpful;involved;supportive    Able to Return to Prior Arrangements  yes       Resource/Environmental Concerns    Resource/Environmental Concerns  none    Transportation Concerns  car, none       Transition Planning    Patient/Family Anticipates Transition to  home    Patient/Family Anticipated Services at Transition  none    Transportation Anticipated  family or friend will provide       Discharge Needs Assessment    Readmission Within the Last 30 Days  no previous admission in last 30 days    Concerns to be Addressed  no discharge needs identified;denies needs/concerns at this time    Equipment Currently Used at Home  walker, rolling back brace     Anticipated Changes Related to Illness  none    Equipment Needed After Discharge  none        Discharge Plan     Row Name 07/07/20 5472       Plan    Plan  Home with family     Patient/Family in Agreement with Plan  yes    Plan Comments  CCP met with patient and patient's significant other, Gaston [phone]. CCP role explained and discharge planning discussed. Face sheet verified and patient's PCP is Dr. Thornton. Patient lives her three daughters (20, 14, and 12 years old). Patient states her mother is staying with them while she is here. Patient has 4 steps to the entrance of her home (handrail present). Patient's bedroom and bathroom are on the main level. Patient has been to [facility] for SNF in the past and denies any  home health services. Patient has completed outpatient PT at [facility]. Patient is independent with her ADLs but does have access to a walker if needed. Patient's preferred pharmacy is [facility]; patient denies having trouble obtaining her medications. CCP discussed OT recommendations of home with assist; patient states her family can assist her as needed. Patient plans to return home. CCP will follow for any needs to arise. Chela ALVARES         Destination      Coordination has not been started for this encounter.      Durable Medical Equipment      Coordination has not been started for this encounter.      Dialysis/Infusion      Coordination has not been started for this encounter.      Home Medical Care      Coordination has not been started for this encounter.      Therapy      Coordination has not been started for this encounter.      Community Resources      Coordination has not been started for this encounter.          Demographic Summary     Row Name 07/07/20 1553       General Information    Admission Type  inpatient    Arrived From  emergency department    Referral Source  admission list    Reason for Consult  discharge planning    Preferred Language  English     Used During This Interaction  no        Functional Status     Row Name 07/07/20 1553       Functional Status    Usual Activity Tolerance  good    Current Activity Tolerance  good       Functional Status, IADL    Medications  independent    Meal Preparation  independent    Housekeeping  independent    Laundry  independent    Shopping  independent       Mental Status    General Appearance WDL  WDL        Psychosocial    No documentation.       Abuse/Neglect    No documentation.       Legal    No documentation.       Substance Abuse    No documentation.       Patient Forms    No documentation.           DIA Leon

## 2020-07-07 NOTE — ED PROVIDER NOTES
EMERGENCY DEPARTMENT ENCOUNTER    CHIEF COMPLAINT  Chief Complaint: Low back pain  History given by: Patient  History limited by: None  Room Number: P477/1  PMD: Jonh Thornton MD      HPI:  Pt is a 35 y.o. female who presents complaining of sudden onset sharp lower back pain that was present when she awoke yesterday morning there is been progressively worsening to today's maximal intensity.  She states the pain is moderate to severe in intensity.  She has had this pain before and she states that it feels almost identical to when she required a fusion of her L5-S1 in 2017.  She states the post that surgery she has had no issues with her low back and she has felt quite well.  At home she took Percocet as well as tizanidine as well as gabapentin without any relief.  She states the pain radiates down bilateral legs.  She denies numbness of the extremities, numbness of the groin, any loss of sensation, any changes in bowel or bladder habits most notably urinary retention or fecal incontinence.  She denies associated fevers and chills, nausea and vomiting, headache, dizziness, vertigo, chest pain.  She states the pain is far worse with movement and has not been relieved by anything thus far.  The patient currently states that she is unable to walk secondary to her pain.      PAST MEDICAL HISTORY  Active Ambulatory Problems     Diagnosis Date Noted   • Acute midline low back pain with left-sided sciatica 08/30/2017   • Tobacco abuse 08/31/2017   • Chronic tachycardia 09/15/2017     Resolved Ambulatory Problems     Diagnosis Date Noted   • Stenosis, spinal, lumbar 09/15/2017   • Panic attack 09/15/2017     Past Medical History:   Diagnosis Date   • Depression with anxiety    • Genital herpes    • Heart murmur    • Injury of back    • Lumbar disc disease    • Tingling        PAST SURGICAL HISTORY  Past Surgical History:   Procedure Laterality Date   • ABDOMINAL SURGERY      BENIGN TUMOR REMOVED   • BREAST  AUGMENTATION     • LUMBAR FUSION N/A 9/15/2017    Procedure: MINIMINALLY INVASIVE SPINAL TRANSFORMINAL LUMBAR INTERBODY FUSION L5-S1;  Surgeon: Ten Johns DO;  Location: [facility] OR;  Service:    • WISDOM TOOTH EXTRACTION         FAMILY HISTORY  Family History   Problem Relation Age of Onset   • Malig Hyperthermia Neg Hx        SOCIAL HISTORY  Social History     Socioeconomic History   • Marital status:      Spouse name: Not on file   • Number of children: Not on file   • Years of education: Not on file   • Highest education level: Not on file   Tobacco Use   • Smoking status: Former Smoker     Packs/day: 0.50     Types: Cigarettes   • Smokeless tobacco: Never Used   • Tobacco comment: SOCIAL. NONE IN PAST 3 WEEKS   Substance and Sexual Activity   • Alcohol use: Yes     Comment: every other week   • Drug use: No       ALLERGIES  Patient has no known allergies.    REVIEW OF SYSTEMS  Review of Systems   Constitutional: Negative for fever.   HENT: Negative for sore throat.    Eyes: Negative.    Respiratory: Negative for cough and shortness of breath.    Cardiovascular: Negative for chest pain.   Gastrointestinal: Negative for abdominal pain, diarrhea and vomiting.   Genitourinary: Negative for dysuria.   Musculoskeletal: Positive for back pain and gait problem. Negative for neck pain.   Skin: Negative for rash.   Allergic/Immunologic: Negative.    Neurological: Negative for weakness, numbness and headaches.   Hematological: Negative.    Psychiatric/Behavioral: Negative.    All other systems reviewed and are negative.      PHYSICAL EXAM  ED Triage Vitals [07/06/20 2058]   Temp Heart Rate Resp BP SpO2   98.9 °F (37.2 °C) 93 18 -- 98 %      Temp src Heart Rate Source Patient Position BP Location FiO2 (%)   Tympanic Monitor -- -- --       Physical Exam   Constitutional: She is oriented to person, place, and time. No distress.   HENT:   Head: Normocephalic and atraumatic.   Eyes: Pupils are equal, round, and  reactive to light. EOM are normal.   Neck: Normal range of motion. Neck supple.   Cardiovascular: Normal rate, regular rhythm and normal heart sounds.   Pulmonary/Chest: Effort normal and breath sounds normal. No respiratory distress.   Abdominal: Soft. There is no tenderness. There is no rebound and no guarding.   Musculoskeletal: Normal range of motion. She exhibits no edema.   There is tenderness to palpation diffusely along the lumbar spine without deformity.  Straight leg raise negative   Neurological: She is alert and oriented to person, place, and time. She has normal sensation and normal strength.   Skin: Skin is warm and dry. No rash noted.   Psychiatric: Mood and affect normal.   Nursing note and vitals reviewed.      LAB RESULTS  Lab Results (last 24 hours)     Procedure Component Value Units Date/Time    CBC & Differential [[id number]] Collected:  07/07/20 0323    Specimen:  Blood Updated:  07/07/20 0336    Narrative:       The following orders were created for panel order CBC & Differential.  Procedure                               Abnormality         Status                     ---------                               -----------         ------                     CBC Auto Differential[[id number]]        Abnormal            Final result                 Please view results for these tests on the individual orders.    Comprehensive Metabolic Panel [[id number]]  (Abnormal) Collected:  07/07/20 0323    Specimen:  Blood Updated:  07/07/20 0355     Glucose 113 mg/dL      BUN 9 mg/dL      Creatinine 0.66 mg/dL      Sodium 139 mmol/L      Potassium 4.1 mmol/L      Chloride 104 mmol/L      CO2 26.1 mmol/L      Calcium 9.3 mg/dL      Total Protein 6.5 g/dL      Albumin 4.40 g/dL      ALT (SGPT) 12 U/L      AST (SGOT) 12 U/L      Alkaline Phosphatase 42 U/L      Total Bilirubin 0.3 mg/dL      eGFR Non African Amer 101 mL/min/1.73      Globulin 2.1 gm/dL      A/G Ratio 2.1 g/dL      BUN/Creatinine Ratio 13.6     Anion  Gap 8.9 mmol/L     Narrative:       GFR Normal >60  Chronic Kidney Disease <60  Kidney Failure <15      CBC Auto Differential [[id number]]  (Abnormal) Collected:  07/07/20 0323    Specimen:  Blood Updated:  07/07/20 0336     WBC 5.96 10*3/mm3      RBC 4.02 10*6/mm3      Hemoglobin 11.1 g/dL      Hematocrit 34.0 %      MCV 84.6 fL      MCH 27.6 pg      MCHC 32.6 g/dL      RDW 13.0 %      RDW-SD 41.0 fl      MPV 10.3 fL      Platelets 260 10*3/mm3      Neutrophil % 69.0 %      Lymphocyte % 24.5 %      Monocyte % 5.2 %      Eosinophil % 0.5 %      Basophil % 0.5 %      Immature Grans % 0.3 %      Neutrophils, Absolute 4.11 10*3/mm3      Lymphocytes, Absolute 1.46 10*3/mm3      Monocytes, Absolute 0.31 10*3/mm3      Eosinophils, Absolute 0.03 10*3/mm3      Basophils, Absolute 0.03 10*3/mm3      Immature Grans, Absolute 0.02 10*3/mm3      nRBC 0.0 /100 WBC     Basic Metabolic Panel [[id number]]  (Abnormal) Collected:  07/07/20 0323    Specimen:  Blood Updated:  07/07/20 0404     Glucose 113 mg/dL      BUN 9 mg/dL      Creatinine 0.66 mg/dL      Sodium 139 mmol/L      Potassium 4.1 mmol/L      Chloride 104 mmol/L      CO2 26.1 mmol/L      Calcium 9.3 mg/dL      eGFR Non African Amer 101 mL/min/1.73      BUN/Creatinine Ratio 13.6     Anion Gap 8.9 mmol/L     Narrative:       GFR Normal >60  Chronic Kidney Disease <60  Kidney Failure <15            I ordered the above labs and reviewed the results    RADIOLOGY  XR Spine Lumbar Complete 4+VW   Final Result      MRI Lumbar Spine Without Contrast    (Results Pending)        I ordered the above noted radiological studies. Interpreted by radiologist.  Reviewed by me in PACS.       PROCEDURES  Procedures      PROGRESS AND CONSULTS     The patient was wearing a facemask upon entrance into the room and remained in such throughout their visit.  I was wearing PPE including a facemask as well as gloves at any point entering the room and throughout the visit    0310  I did inform the  patient that I think it would be in her best interest to be admitted to the hospital today so we can do a more appropriate work-up secondary to this lumbar radiculopathy.  I do worry that she is on fairly heavy pain medication prior to arrival and that was not controlling her pain.  The patient is agreeable to this and all questions have been answered.  We will admit her to the hospital for further management and treatment.    0325  Case discussed with BHUMI Fuentes for [facility], who agrees to admit the patient to the hospital for Dr. Mooney      MEDICAL DECISION MAKING  Results were reviewed/discussed with the patient and they were also made aware of online access. Pt also made aware that some labs, such as cultures, will not be resulted during ER visit and follow up with PMD is necessary.     MDM  Number of Diagnoses or Management Options  Intractable low back pain:   Lumbar radiculopathy:      Amount and/or Complexity of Data Reviewed  Clinical lab tests: ordered and reviewed  Tests in the radiology section of CPT®: ordered and reviewed  Tests in the medicine section of CPT®: ordered and reviewed  Review and summarize past medical records: yes (The most recent medical record available for review was her surgical evaluation 9/15/2017 where she had a spinal fusion of L5-S1)  Discuss the patient with other providers: yes (BHUMI Fuentes for [facility] who will admit the patient for Dr. Mooney)  Independent visualization of images, tracings, or specimens: yes (X-ray of the lumbar spine that showed her lumbar fusion hardware to be fully intact.)           DIAGNOSIS  Final diagnoses:   Intractable low back pain   Lumbar radiculopathy       DISPOSITION  ADMISSION    Discussed treatment plan and reason for admission with pt/family and admitting physician.  Pt/family voiced understanding of the plan for admission for further testing/treatment as needed.         Latest Documented Vital Signs:  As of 06:36  BP- 133/90  HR- 66 Temp- 97.5 °F (36.4 °C) (Oral) O2 sat- 98%         Jovan Jordan MD  07/07/20 0630

## 2020-07-08 PROCEDURE — 97161 PT EVAL LOW COMPLEX 20 MIN: CPT

## 2020-07-08 PROCEDURE — 25010000002 HYDROMORPHONE PER 4 MG: Performed by: INTERNAL MEDICINE

## 2020-07-08 PROCEDURE — 97110 THERAPEUTIC EXERCISES: CPT

## 2020-07-08 RX ADMIN — HYDROCODONE BITARTRATE AND ACETAMINOPHEN 1 TABLET: 7.5; 325 TABLET ORAL at 20:02

## 2020-07-08 RX ADMIN — HYDROCODONE BITARTRATE AND ACETAMINOPHEN 1 TABLET: 7.5; 325 TABLET ORAL at 23:46

## 2020-07-08 RX ADMIN — HYDROMORPHONE HYDROCHLORIDE 0.5 MG: 1 INJECTION, SOLUTION INTRAMUSCULAR; INTRAVENOUS; SUBCUTANEOUS at 08:19

## 2020-07-08 RX ADMIN — TIZANIDINE 4 MG: 4 TABLET ORAL at 23:46

## 2020-07-08 RX ADMIN — GABAPENTIN 300 MG: 300 CAPSULE ORAL at 17:38

## 2020-07-08 RX ADMIN — HYDROCODONE BITARTRATE AND ACETAMINOPHEN 1 TABLET: 7.5; 325 TABLET ORAL at 05:04

## 2020-07-08 RX ADMIN — SODIUM CHLORIDE, PRESERVATIVE FREE 10 ML: 5 INJECTION INTRAVENOUS at 20:03

## 2020-07-08 RX ADMIN — SODIUM CHLORIDE, PRESERVATIVE FREE 10 ML: 5 INJECTION INTRAVENOUS at 08:20

## 2020-07-08 RX ADMIN — GABAPENTIN 300 MG: 300 CAPSULE ORAL at 20:02

## 2020-07-08 RX ADMIN — HYDROMORPHONE HYDROCHLORIDE 0.5 MG: 1 INJECTION, SOLUTION INTRAMUSCULAR; INTRAVENOUS; SUBCUTANEOUS at 14:37

## 2020-07-08 RX ADMIN — GABAPENTIN 300 MG: 300 CAPSULE ORAL at 08:19

## 2020-07-08 RX ADMIN — HYDROMORPHONE HYDROCHLORIDE 0.5 MG: 1 INJECTION, SOLUTION INTRAMUSCULAR; INTRAVENOUS; SUBCUTANEOUS at 04:06

## 2020-07-08 RX ADMIN — HYDROCODONE BITARTRATE AND ACETAMINOPHEN 1 TABLET: 7.5; 325 TABLET ORAL at 12:07

## 2020-07-08 NOTE — PLAN OF CARE
Problem: Patient Care Overview  Goal: Plan of Care Review  Flowsheets  Taken 7/8/2020 1443  Plan of Care Reviewed With: patient  Taken 7/8/2020 1445  Outcome Summary: pt 34 yo female presents w low back pain, reports she thinks she irritated her back when climbing up the ladder at the back of a boat this weekend. Pt w hx of L5-S1 fusion 2017, pt wearing supportive back brace from home. pt reports pain at rest and standing minimal however flexion is position of aggrevation, 10/10. pt able to ambulate safe household distances 200ft supervision, antalgic slowed shawanda. pt educated on and performed spinal therex HEP w good tolerance. Nat to see pt and address medical options. encouraged cont'ed mobilzation as [place] and OP PT at [place]. will sign off.   ..Patient was wearing a face mask during this therapy encounter. Therapist used appropriate personal protective equipment including mask and gloves.  Mask used was standard procedure mask. Appropriate PPE was worn during the entire therapy session. Hand hygiene was completed before and after therapy session. Patient is not in enhanced droplet precautions.

## 2020-07-08 NOTE — PLAN OF CARE
Problem: Patient Care Overview  Goal: Plan of Care Review  Outcome: Ongoing (interventions implemented as appropriate)  Flowsheets  Taken 7/8/2020 1613 by Marichuy Taylor RN  Progress: no change  Outcome Summary: pt has been in pain most of shift, medicated with PO and IV and not much relief. pt up walking in room and up in chair to try to get comfortable. waiting for sx to read MRI and see pt to discuss plan at this time. will continue to monitor.  Taken 7/8/2020 1443 by Little Cheung, PT  Plan of Care Reviewed With: patient

## 2020-07-08 NOTE — THERAPY DISCHARGE NOTE
Acute Care - Physical Therapy Initial Eval/Discharge  [facility]     Patient Name: Pretty Israel  : 1985  MRN: [id number]  Today's Date: 2020   Onset of Illness/Injury or Date of Surgery: 20  Date of Referral to PT: 20  Referring Physician: Vinicio      Admit Date: 2020    Visit Dx:    ICD-10-CM ICD-9-CM   1. Intractable low back pain M54.5 724.2   2. Lumbar radiculopathy M54.16 724.4   3. Generalized weakness R53.1 780.79     Patient Active Problem List   Diagnosis   • Acute midline low back pain with left-sided sciatica   • Tobacco abuse   • Chronic tachycardia   • Intractable low back pain   • Class 2 obesity in adult   • Anemia, chronic disease   • Hyperglycemia     Past Medical History:   Diagnosis Date   • Depression with anxiety     SITUATION   • Genital herpes    • Heart murmur     BENIGN   • Injury of back    • Lumbar disc disease    • Tingling     LEFT LEG FROM BACK TO FOOT     Past Surgical History:   Procedure Laterality Date   • ABDOMINAL SURGERY      BENIGN TUMOR REMOVED   • BREAST AUGMENTATION     • LUMBAR FUSION N/A 9/15/2017    Procedure: MINIMINALLY INVASIVE SPINAL TRANSFORMINAL LUMBAR INTERBODY FUSION L5-S1;  Surgeon: Ten Johns DO;  Location: [facility] OR;  Service:    • WISDOM TOOTH EXTRACTION            PT ASSESSMENT (last 12 hours)      Physical Therapy Evaluation     Row Name 20 1443          PT Evaluation Time/Intention    Subjective Information  complains of;pain  -     Document Type  discharge evaluation/summary  -     Mode of Treatment  individual therapy;physical therapy  -     Patient Effort  excellent  -     Comment  pt wearing back support brace from home for pain management   -     Row Name 20 1443          General Information    Patient Profile Reviewed?  yes  -     Onset of Illness/Injury or Date of Surgery  20  -     Referring Physician  Vinicio  -     Patient Observations  alert;cooperative;agree to therapy   -     General Observations of Patient  pt supine in bed no acute distress  -     Prior Level of Function  independent:  -     Equipment Currently Used at Home  none  -     Pertinent History of Current Functional Problem  LBP hx of L5-S1 fusion  -     Benefits Reviewed  patient:  -Critical access hospital Name 07/08/20 1443          Cognitive Assessment/Intervention- PT/OT    Orientation Status (Cognition)  oriented x 4  -     Follows Commands (Cognition)  WFL  -Critical access hospital Name 07/08/20 1443          Bed Mobility Assessment/Treatment    Supine-Sit Girdler (Bed Mobility)  conditional independence  -     Sit-Supine Girdler (Bed Mobility)  conditional independence  -     Assistive Device (Bed Mobility)  bed rails  -     Comment (Bed Mobility)  pt performs log rolling indep'ly  -Critical access hospital Name 07/08/20 1443          Transfer Assessment/Treatment    Comment (Transfers)  pt slow to carryout functional tasks 2' pain however indep when performing  -     Sit-Stand Girdler (Transfers)  independent  -     Stand-Sit Girdler (Transfers)  independent  -Critical access hospital Name 07/08/20 1443          Gait/Stairs Assessment/Training    Girdler Level (Gait)  supervision  -     Distance in Feet (Gait)  200  -     Pattern (Gait)  step-through  -     Deviations/Abnormal Patterns (Gait)  shawanda decreased;antalgic  -Critical access hospital Name 07/08/20 1443          General ROM    GENERAL ROM COMMENTS  BLEs functional  -Critical access hospital Name 07/08/20 1443          MMT (Manual Muscle Testing)    General MMT Comments  BLEs grossly at least 4/5  -Critical access hospital Name 07/08/20 1443          Motor Assessment/Intervention    Additional Documentation  Therapeutic Exercise (Group);Therapeutic Exercise Interventions (Group);Balance Interventions (Group);Balance (Group)  -Critical access hospital Name 07/08/20 1443          Therapeutic Exercise    Position (Therapeutic Exercise)  standing;supine  -     Sets/Reps (Therapeutic Exercise)  1/10  -      Comment (Therapeutic Exercise)  SKTC 30 sec ea, LTR 10 reps, standing lumbar extension 30 sec  -     Row Name 07/08/20 1443          Static Sitting Balance    Level of [place] (Unsupported Sitting, Static Balance)  independent  -     Row Name 07/08/20 1443          Static Standing Balance    Level of [place] (Supported Standing, Static Balance)  independent  -     Row Name 07/08/20 1443          Pain Scale: Numbers Pre/Post-Treatment    Pain Scale: Numbers, Pretreatment  0/10 - no pain none at rest and standing  -     Pain Scale: Numbers, Post-Treatment  8/10 worse w flexion  -     Pain Location  back  -     Pain Intervention(s)  Ambulation/increased activity;Repositioned  -     Row Name 07/08/20 1443          Plan of Care Review    Plan of Care Reviewed With  patient  -     Row Name 07/08/20 1443          Physical Therapy Clinical Impression    Date of Referral to PT  07/08/20  -     Pathology/Pathophysiology Noted (Describe Specifically for Each System)  neuromuscular  -     Row Name 07/08/20 1443          Positioning and Restraints    Pre-Treatment Position  in bed  -     Post Treatment Position  bed  -     In Bed  supine;call light within reach;encouraged to call for assist  -       User Key  (r) = Recorded By, (t) = Taken By, (c) = Cosigned By    Initials Name Provider Type     Little Cheung, PT Physical Therapist          Physical Therapy Education                 Title: PT OT SLP Therapies (Done)     Topic: Physical Therapy (Done)     Point: Home exercise program (Done)     Description:   Instruct learner(s) on appropriate technique for monitoring, assisting and/or progressing patient with therapeutic exercises and activities.              Learning Progress Summary           Patient Acceptance, H, JENNY,VU by  at 7/8/2020 3239    Comment:  low back therex program                               User Key     Initials Effective Dates Name Provider Type [facility]  04/03/18 -  Little Cheung, PT Physical Therapist PT                PT Recommendation and Plan  Anticipated Discharge Disposition (PT): home with OP services  Therapy Frequency (PT Clinical Impression): evaluation only  Outcome Summary/Treatment Plan (PT)  Anticipated Discharge Disposition (PT): home with OP services  Plan of Care Reviewed With: patient  Outcome Summary: pt presents w low back pain, reports she thinks she irritated spine when climbing the ladder at the back of a boat this weekend. pt reports pain at rest and standing minimal however flexion is position of aggrevation, 10/10. pt able to ambulate safe household distances 200ft supervision, antalgic slowed shawanda. pt educated on and performed spinal therex HEP w good tolerance. encouraged cont'ed mobilzation as [place] and OP PT at [place]. will sign off.    Outcome Measures     Row Name 07/08/20 1400 07/07/20 1500          How much help from another person do you currently need...    Turning from your back to your side while in flat bed without using bedrails?  4  -LH  --     Moving from lying on back to sitting on the side of a flat bed without bedrails?  4  -LH  --     Moving to and from a bed to a chair (including a wheelchair)?  4  -LH  --     Standing up from a chair using your arms (e.g., wheelchair, bedside chair)?  4  -LH  --     Climbing 3-5 steps with a railing?  3  -LH  --     To walk in hospital room?  4  -LH  --     AM-PAC 6 Clicks Score (PT)  23  -LH  --        How much help from another is currently needed...    Putting on and taking off regular lower body clothing?  --  2  -SK     Bathing (including washing, rinsing, and drying)  --  3  -SK     Toileting (which includes using toilet bed pan or urinal)  --  3  -SK     Putting on and taking off regular upper body clothing  --  3  -SK     Taking care of personal grooming (such as brushing teeth)  --  3  -SK     Eating meals  --  4  -SK     AM-PAC 6 Clicks Score (OT)  --  18  -SK        Functional  Assessment    Outcome Measure Options  AM-PAC 6 Clicks Basic Mobility (PT)  -  AM-PAC 6 Clicks Daily Activity (OT)  -SK       User Key  (r) = Recorded By, (t) = Taken By, (c) = Cosigned By    Initials Name Provider Type     Little Cheung, PT Physical Therapist    SK Tracy Glover, OT Occupational Therapist           Time Calculation:   PT Charges     Row Name 07/08/20 1453             Time Calculation    Start Time  1352  -      Stop Time  1425  -      Time Calculation (min)  33 min  -      PT Received On  07/08/20  -         Time Calculation- PT    Total Timed Code Minutes- PT  23 minute(s)  -        User Key  (r) = Recorded By, (t) = Taken By, (c) = Cosigned By    Initials Name Provider Type     Little Cheung, PT Physical Therapist        Therapy Charges for Today     Code Description Service Date Service Provider Modifiers Qty    82939585197 HC PT EVAL LOW COMPLEXITY 2 7/8/2020 Little Cheung, PT GP 1    63694883791 HC PT THER PROC EA 15 MIN 7/8/2020 Little Cheung, PT GP 2          PT G-Codes  Outcome Measure Options: AM-PAC 6 Clicks Basic Mobility (PT)  AM-PAC 6 Clicks Score (PT): 23  AM-PAC 6 Clicks Score (OT): 18    PT Discharge Summary  Anticipated Discharge Disposition (PT): home with OP services    Little Cheung, PT  7/8/2020

## 2020-07-08 NOTE — PROGRESS NOTES
[facility] HOSPITALIST    PROGRESS NOTE    Name:  Pretty Israel   Age:  35 y.o.  Sex:  female  :  1985  MRN:  [id number]   Visit Number:  [id number]  Admission Date:  2020  Date Of Service:  20  Primary Care Physician:  Jonh Thornton MD     LOS: 1 day :  Patient Care Team:  Jonh Thornton MD as PCP - General (Internal Medicine):    History taken from:     patient chart    Chief Complaint:      Back pain    Subjective     Interval History:     Patient seen and examined today.  Reviewed history and physical, consults, lab work, x-rays, chart.    Patient was admitted on 2020 with a history of obesity, lumbar disc disease status post lumbar L5-S1 fusion, depression/anxiety, status post breast augmentation who would come in with intractable pain starting on  morning.  She had previous back injury in 2017 with lumbar fusion and had been doing well, actually working and for the most part pain-free.  She denies any inciting injury, but woke up  morning with acute low back pain, and took gabapentin, Percocet, tizanidine and placed a back brace without relief.  She has no loss of bowel or bladder function.    She currently denies any chest pressure, shortness of breath, nausea or vomiting.  She was seen by orthopedic surgery Dr. Johns who had recommended MRI.  MRI showed continued anterior translation of L5 on S1 by 8 mm.  This showed otherwise improvement at this level status post surgery.  There was a disc bulge with an annular fissure at L3-L4.  Await orthopedic surgery recommendations.    Continues to have severe pain requiring Dilaudid as well as oral pain medicine.  She also has nerve pain going down the back of her legs as well radiating down to the knees.    Review of Systems:     All systems were reviewed and negative except for:  Musculoskeletal: positive for  back pain    Objective     Vital Signs:    Temp:  [97 °F (36.1 °C)-97.7 °F  (36.5 °C)] 97.1 °F (36.2 °C)  Heart Rate:  [64-94] 64  Resp:  [16] 16  BP: ()/(56-67) 106/67    Physical Exam:    General: Alert and oriented x4, obese, moderate distress with movement  Heart: Regular rate and rhythm without murmur rub or thrill  Lungs: Clear to auscultation bilaterally without use of accessory muscles or respiration  Abdomen: Soft/nontender/nondistended.  No paraspinal megaly is noted.  MSK: Pain with movement of the lower extremities     Results Review:      I reviewed the patient's new clinical results.    Labs:    Lab Results (last 24 hours)     ** No results found for the last 24 hours. **           Radiology:    Imaging Results (Last 24 Hours)     Procedure Component Value Units Date/Time    MRI Lumbar Spine With & Without Contrast [[id number]] Collected:  07/07/20 1456     Updated:  07/08/20 0907    Narrative:       MRI OF THE LUMBAR SPINE WITH AND WITHOUT CONTRAST     CLINICAL HISTORY: Low back pain and bilateral leg pain and weakness for  the last 3 days. Previous surgery to the lumbar spine.     MRI of the lumbar spine was obtained with sagittal pregadolinium and  postgadolinium T1, proton-density, T2, and STIR images. Additionally,  there are axial pregadolinium and postgadolinium T1 and T2-weighted  images through the lumbar spine.     Comparison is made to previous MRI of the lumbar spine dated 08/31/2017.     FINDINGS:     The conus medullaris terminates at the level of the L1-2 interspace and  has normal signal intensity. The visualized distal thoracic spinal cord  is also unremarkable in appearance.     At T10-11, T11-12, T12-L1, L1-2, and L2-3, there is no significant canal  or foraminal narrowing.     At L3-4, there is a disc bulge with a posterior annular fissure. This  results in a minimal degree of canal and foraminal narrowing. The  findings are stable when compared to the prior exam.     At L4-5, mild facet hypertrophic changes are noted. However, no  significant canal or  foraminal narrowing is identified.     At the L5 level, there were bilateral pars defects identified on  previous study. There was anterior spondylolisthesis of L5 on S1 by  approximately 8 mm. There continues to be anterior translation of L5 on  S1 by a similar amount. However, in the interim since the prior study,  the patient has undergone a fusion procedure via bilateral pedicle  screws and posterior bridging rods fusing L5 down to S1 in addition to  an interbody fusion procedure at the level of the L5-S1 disc space.  Complete osseous fusion is seen across the L5-S1 disc space on the  recent plain films of 07/06/2020. On the previous MRI study, there was  moderate to severe bilateral foraminal narrowing at the L5-S1 level and  this is improved. A mild to moderate degree of bilateral, right greater  than left, foraminal narrowing is identified at the L5-S1 level on the  current exam. This foraminal narrowing is likely in part secondary to  new bone formation resulting in some narrowing of the inferior aspect of  the L5-S1 neural foramina.       Impression:          On the prior study, there were bilateral pars defects at the L5 level  which resulted in an anterior spondylolisthesis of L5 on S1 by  approximately 8 mm. There continues to be anterior translation of L5 on  S1 by a similar amount. However, in the interim since the prior study,  the patient has undergone a fusion procedure via bilateral pedicle  screws and posterior bridging rods in addition to a fusion procedure  across the L5-S1 disc space. Complete osseous fusion is seen across this  disc space on the recent plain films of 07/06/2020. On the previous MRI  study, there was moderate to severe bilateral foraminal narrowing at the  L5-S1 level and this is improved to a mild to moderate degree of  bilateral foraminal narrowing on the current exam.     At the L3-4 level, there is a disc bulge with an annular fissure.  Minimal canal and foraminal narrowing  is identified at the L3-4 level  and the findings are stable when compared to the prior study.     This report was finalized on 7/8/2020 9:04 AM by Dr. Nj Avila M.D.             Medication Review:       gabapentin 300 mg Oral TID   sodium chloride 10 mL Intravenous Q12H            Assessment/Plan     Problem List Items Addressed This Visit        Nervous and Auditory    * (Principal) Intractable low back pain - Primary      Other Visit Diagnoses     Lumbar radiculopathy        Generalized weakness              1.  Intractable low back pain with a history of low back surgery.  2.  Obesity  3.  Mild anemia with no evidence of active bleeding.    Plan:    Await orthopedic surgery recommendations regarding this case.  Continue with pain control.  Hemoglobin A1c was 5.46.  Recommend weight loss.  Monitor anemia.  Reviewed MRI.  Further recommendations will depend on the clinical course.    Edison Michaud DO  07/08/20  16:19

## 2020-07-09 ENCOUNTER — ANESTHESIA EVENT (OUTPATIENT)
Dept: PAIN MEDICINE | Facility: HOSPITAL | Age: 35
End: 2020-07-09

## 2020-07-09 ENCOUNTER — APPOINTMENT (OUTPATIENT)
Dept: GENERAL RADIOLOGY | Facility: HOSPITAL | Age: 35
End: 2020-07-09

## 2020-07-09 ENCOUNTER — APPOINTMENT (OUTPATIENT)
Dept: PAIN MEDICINE | Facility: HOSPITAL | Age: 35
End: 2020-07-09

## 2020-07-09 ENCOUNTER — ANESTHESIA (OUTPATIENT)
Dept: PAIN MEDICINE | Facility: HOSPITAL | Age: 35
End: 2020-07-09

## 2020-07-09 VITALS
HEART RATE: 81 BPM | BODY MASS INDEX: 35.45 KG/M2 | RESPIRATION RATE: 14 BRPM | TEMPERATURE: 97.8 F | WEIGHT: 220.6 LBS | HEIGHT: 66 IN | DIASTOLIC BLOOD PRESSURE: 86 MMHG | SYSTOLIC BLOOD PRESSURE: 134 MMHG | OXYGEN SATURATION: 99 %

## 2020-07-09 PROCEDURE — 25010000002 MIDAZOLAM PER 1 MG: Performed by: ANESTHESIOLOGY

## 2020-07-09 PROCEDURE — 3E0R3BZ INTRODUCTION OF ANESTHETIC AGENT INTO SPINAL CANAL, PERCUTANEOUS APPROACH: ICD-10-PCS | Performed by: ANESTHESIOLOGY

## 2020-07-09 PROCEDURE — 25010000002 METHYLPREDNISOLONE PER 80 MG: Performed by: ANESTHESIOLOGY

## 2020-07-09 PROCEDURE — C1755 CATHETER, INTRASPINAL: HCPCS

## 2020-07-09 PROCEDURE — 77003 FLUOROGUIDE FOR SPINE INJECT: CPT

## 2020-07-09 PROCEDURE — 3E0R33Z INTRODUCTION OF ANTI-INFLAMMATORY INTO SPINAL CANAL, PERCUTANEOUS APPROACH: ICD-10-PCS | Performed by: ANESTHESIOLOGY

## 2020-07-09 PROCEDURE — 25010000002 HYDROMORPHONE PER 4 MG: Performed by: INTERNAL MEDICINE

## 2020-07-09 RX ORDER — METHYLPREDNISOLONE ACETATE 80 MG/ML
80 INJECTION, SUSPENSION INTRA-ARTICULAR; INTRALESIONAL; INTRAMUSCULAR; SOFT TISSUE ONCE
Status: COMPLETED | OUTPATIENT
Start: 2020-07-09 | End: 2020-07-09

## 2020-07-09 RX ORDER — MIDAZOLAM HYDROCHLORIDE 1 MG/ML
1 INJECTION INTRAMUSCULAR; INTRAVENOUS
Status: DISCONTINUED | OUTPATIENT
Start: 2020-07-09 | End: 2020-07-09 | Stop reason: HOSPADM

## 2020-07-09 RX ORDER — FENTANYL CITRATE 50 UG/ML
50 INJECTION, SOLUTION INTRAMUSCULAR; INTRAVENOUS AS NEEDED
Status: DISCONTINUED | OUTPATIENT
Start: 2020-07-09 | End: 2020-07-09 | Stop reason: HOSPADM

## 2020-07-09 RX ORDER — LIDOCAINE HYDROCHLORIDE 10 MG/ML
1 INJECTION, SOLUTION INFILTRATION; PERINEURAL ONCE
Status: DISCONTINUED | OUTPATIENT
Start: 2020-07-09 | End: 2020-07-09 | Stop reason: HOSPADM

## 2020-07-09 RX ADMIN — METHYLPREDNISOLONE ACETATE 80 MG: 80 INJECTION, SUSPENSION INTRA-ARTICULAR; INTRALESIONAL; INTRAMUSCULAR; SOFT TISSUE at 14:42

## 2020-07-09 RX ADMIN — HYDROCODONE BITARTRATE AND ACETAMINOPHEN 1 TABLET: 7.5; 325 TABLET ORAL at 11:05

## 2020-07-09 RX ADMIN — HYDROCODONE BITARTRATE AND ACETAMINOPHEN 1 TABLET: 7.5; 325 TABLET ORAL at 05:46

## 2020-07-09 RX ADMIN — HYDROCODONE BITARTRATE AND ACETAMINOPHEN 1 TABLET: 7.5; 325 TABLET ORAL at 15:46

## 2020-07-09 RX ADMIN — GABAPENTIN 300 MG: 300 CAPSULE ORAL at 15:49

## 2020-07-09 RX ADMIN — GABAPENTIN 300 MG: 300 CAPSULE ORAL at 08:16

## 2020-07-09 RX ADMIN — HYDROMORPHONE HYDROCHLORIDE 0.5 MG: 1 INJECTION, SOLUTION INTRAMUSCULAR; INTRAVENOUS; SUBCUTANEOUS at 13:27

## 2020-07-09 RX ADMIN — MIDAZOLAM 2 MG: 1 INJECTION INTRAMUSCULAR; INTRAVENOUS at 14:37

## 2020-07-09 RX ADMIN — SODIUM CHLORIDE, PRESERVATIVE FREE 10 ML: 5 INJECTION INTRAVENOUS at 08:17

## 2020-07-09 RX ADMIN — HYDROMORPHONE HYDROCHLORIDE 0.5 MG: 1 INJECTION, SOLUTION INTRAMUSCULAR; INTRAVENOUS; SUBCUTANEOUS at 08:17

## 2020-07-09 RX ADMIN — HYDROMORPHONE HYDROCHLORIDE 0.5 MG: 1 INJECTION, SOLUTION INTRAMUSCULAR; INTRAVENOUS; SUBCUTANEOUS at 18:07

## 2020-07-09 NOTE — H&P
[facility]    History and Physical    Patient Name: Pretty Israel  :  1985  MRN:  [id number]  Date of Admission: 2020    Subjective     Patient is a 35 y.o. female presents with chief complaint of acute, constant, severe, 10/10, aching, muscle spasms, sharp, tingling, unknown etiology low back and leg: bilateral pain.  Onset of symptoms was abrupt starting 4 days ago.  Symptoms are associated/aggravated by activity, lifting, sitting, standing, straining or twisting. Symptoms improve with pain medication, ice, lying down, injection and rest    The following portions of the patients history were reviewed and updated as appropriate: current medications, allergies, past medical history, past surgical history, past family history, past social history and problem list                Objective     Past Medical History:   Past Medical History:   Diagnosis Date   • Depression with anxiety     SITUATION   • Genital herpes    • Heart murmur     BENIGN   • Injury of back    • Lumbar disc disease    • Tingling     LEFT LEG FROM BACK TO FOOT     Past Surgical History:   Past Surgical History:   Procedure Laterality Date   • ABDOMINAL SURGERY      BENIGN TUMOR REMOVED   • BREAST AUGMENTATION     • LUMBAR FUSION N/A 9/15/2017    Procedure: MINIMINALLY INVASIVE SPINAL TRANSFORMINAL LUMBAR INTERBODY FUSION L5-S1;  Surgeon: Ten Johns DO;  Location: [facility] OR;  Service:    • WISDOM TOOTH EXTRACTION       Family History:   Family History   Problem Relation Age of Onset   • Malig Hyperthermia Neg Hx      Social History:   Social History     Tobacco Use   • Smoking status: Former Smoker     Packs/day: 0.50     Types: Cigarettes   • Smokeless tobacco: Never Used   • Tobacco comment: SOCIAL. NONE IN PAST 3 WEEKS   Substance Use Topics   • Alcohol use: Yes     Comment: every other week   • Drug use: No       Vital Signs Range for the last 24 hours  Temperature: Temp:  [36.2 °C (97.1 °F)-36.4 °C (97.6 °F)] 36.2 °C  "(97.1 °F)   Temp Source: Temp src: Oral   BP: BP: (106-124)/(67-84) 106/75   Pulse: Heart Rate:  [64-86] 86   Respirations: Resp:  [16] 16   SPO2: SpO2:  [96 %-100 %] 96 %   O2 Amount (l/min):     O2 Devices Device (Oxygen Therapy): room air   Weight:       Flowsheet Rows      First Filed Value   Admission Height  167.6 cm (66\") Documented at 07/06/2020 2058   Admission Weight  90.7 kg (200 lb) Documented at 07/07/2020 0310          --------------------------------------------------------------------------------    Current Facility-Administered Medications   Medication Dose Route Frequency Provider Last Rate Last Dose   • acetaminophen (TYLENOL) tablet 650 mg  650 mg Oral Q4H PRN Maya Jarrett APRN        Or   • acetaminophen (TYLENOL) 160 MG/5ML solution 650 mg  650 mg Oral Q4H PRN Maya Jarrett APRN        Or   • acetaminophen (TYLENOL) suppository 650 mg  650 mg Rectal Q4H PRN Maya Jarrett APRN       • bisacodyl (DULCOLAX) EC tablet 5 mg  5 mg Oral Daily PRN Maya Jarrett APRN       • calcium carbonate (TUMS) chewable tablet 500 mg (200 mg elemental)  2 tablet Oral BID PRN Maya Jarrett APRN       • fentaNYL citrate (PF) (SUBLIMAZE) injection 50 mcg  50 mcg Intravenous PRN Bladimir Dumont MD       • gabapentin (NEURONTIN) capsule 300 mg  300 mg Oral TID Buster Peterson APRN   300 mg at 07/09/20 0816   • HYDROcodone-acetaminophen (NORCO) 7.5-325 MG per tablet 1 tablet  1 tablet Oral Q4H PRN Christopher Moreland MD   1 tablet at 07/09/20 1105   • HYDROmorphone (DILAUDID) injection 0.5 mg  0.5 mg Intravenous Q2H PRN Jevon Mooney MD   0.5 mg at 07/09/20 1327   • iopamidol (ISOVUE-M 200) injection 41%  3 mL Epidural Once in imaging Bladimir Dumont MD       • lidocaine (XYLOCAINE) 1 % injection 1 mL  1 mL Intradermal Once Bladimir Dumont MD       • methylPREDNISolone acetate (DEPO-medrol) injection 80 mg  80 mg Epidural Once Bladimir Dumont MD       • midazolam " (VERSED) injection 1 mg  1 mg Intravenous Q5 Min PRN Bladimir Dumont MD       • ondansetron (ZOFRAN) tablet 4 mg  4 mg Oral Q6H PRN Maya Jarrett APRN        Or   • ondansetron (ZOFRAN) injection 4 mg  4 mg Intravenous Q6H PRN Maya Jarrett APRN       • sodium chloride 0.9 % flush 10 mL  10 mL Intravenous PRN Jovan Jordan MD       • sodium chloride 0.9 % flush 10 mL  10 mL Intravenous Q12H Maya Jarrett APRN   10 mL at 07/09/20 0817   • sodium chloride 0.9 % flush 10 mL  10 mL Intravenous PRN Maya Jarrett APRN       • tiZANidine (ZANAFLEX) tablet 4 mg  4 mg Oral Nightly PRN Buster Peterson APRN   4 mg at 07/08/20 2346       --------------------------------------------------------------------------------  Assessment/Plan      Anesthesia Evaluation     Patient summary reviewed and Nursing notes reviewed         Pain impairs ability to perform ADLs: Dressing, Housekeeping, Ambulation and Exercise/Activity  Modalities previously tried to control pain with limited effectiveness within the last 4-6 weeks: Ice, Rest, Prescription medications, Steroids and Physical therapy     Airway   Mallampati: II  Dental - normal exam     Pulmonary - negative pulmonary ROS and normal exam   Cardiovascular - normal exam    (+) valvular problems/murmurs murmur,       Neuro/Psych- negative ROS and neuro exam normal  GI/Hepatic/Renal/Endo    (+) obesity,       Musculoskeletal (-) negative ROS and normal exam    Abdominal  - normal exam   Substance History - negative use     OB/GYN negative ob/gyn ROS         Other                 Diagnosis and Plan    Treatment Plan  ASA 3      Procedures: Lumbar Epidural Steroid Injection(LESI), With fluoroscopy,       Anesthetic plan and risks discussed with patient.          Diagnosis     * Lumbar degenerative disc disease [M51.36]     * Lumbar disc displacement without myelopathy [M51.26]      CHIEF COMPLAINT:       HISTORY OF PRESENT ILLNESS:      PAST MEDICAL  "HISTORY:  No current facility-administered medications on file prior to encounter.      Current Outpatient Medications on File Prior to Encounter   Medication Sig Dispense Refill   • gabapentin (NEURONTIN) 300 MG capsule Take 300 mg by mouth 3 (Three) Times a Day.     • tiZANidine (ZANAFLEX) 4 MG tablet Take 4 mg by mouth At Night As Needed for Muscle Spasms.     • ibuprofen (ADVIL,MOTRIN) 200 MG tablet Take 800 mg by mouth Every 6 (Six) Hours As Needed for Mild Pain . WILL HOLD FOR SURGERY.     • oxyCODONE-acetaminophen (PERCOCET) 7.5-325 MG per tablet Take 1 tablet by mouth 2 (Two) Times a Day As Needed for Moderate Pain .         Past Medical History:   Diagnosis Date   • Depression with anxiety     SITUATION   • Genital herpes    • Heart murmur     BENIGN   • Injury of back    • Lumbar disc disease    • Tingling     LEFT LEG FROM BACK TO FOOT         SOCIAL HISTORY:  No tobacco    REVIEW OF SYSTEMS:  No hematologic infectious or constitutional symptoms  Other review of systems non-contributory    PHYSICAL EXAM:  /75 (BP Location: Left arm, Patient Position: Sitting)   Pulse 86   Temp 36.2 °C (97.1 °F) (Oral)   Resp 16   Ht 167.6 cm (66\")   Wt 100 kg (220 lb 9.6 oz)   LMP 07/01/2020 (Approximate)   SpO2 96%   BMI 35.61 kg/m²   Well-developed well-nourished no acute distress  Extra ocular movements intact  Mallampati class 2 airway  Cardiac:  Regular rate and rhythm  Lungs:  Clear to auscultation bilaterally with good effort  Alert and oriented ×3  Deep tendon reflexes normal in the bilateral patella  Negative straight leg raise bilaterally  5 out of 5 strength bilateral upper and lower extremities  Lumbar spine without obvious deformities ecchymoses  Lumbar spine nontender to palpation      DIAGNOSIS:  Post-Op Diagnosis Codes:     * Lumbar degenerative disc disease [M51.36]     * Lumbar disc displacement without myelopathy [M51.26]    PLAN:  1.  Lumbar 4 epidural steroid injections, up to 3, spaced " 1-2 weeks apart.  If pain control is acceptable after 1 or 2 injections, it was discussed with the patient that they may return for the subsequent injections if and when their pain returns.  The risks were discussed with the patient including failure of relief, worsening pain, Headache (post dural puncture headache), bleeding (epidural hematoma) and infection (epidural abscess or skin infection).  2.  Physical therapy exercises at home as prescribed by physical therapy or from the pain clinic handout (given to the patient).  Continuation of these exercises every day, or multiple times per week, even when the patient has good pain relief, was stressed to the patient as a preventative measure to decrease the frequency and severity of future pain episodes.  3.  Continue pain medicines as already prescribed.  If patient not currently taking any, it is recommended to begin Acetaminophen 1000 mg po q 8 hours.  If other medicines containing Acetaminophen are currently prescribed, maintain daily dose at 3000 mg.    4.  If they can tolerate NSAIDS, it is recommended to take Ibuprofen 600 mg po q 6 hours for 7 days during pain exacerbations.  Alternatively, they may substitute an NSAID of their choice (e.g. Aleve).  This may be taken at the same time as Acetaminophen.  5.  Heat and ice to the affected area as tolerated for pain control.  It was discussed that heating pads can cause burns.  6.  Daily low impact exercise such as walking or water exercise was recommended to maintain overall health and aid in weight control.   7.  Follow up as needed for subsequent injections.  8.  Patient was counseled to abstain from tobacco products.    Target : L 4-5

## 2020-07-09 NOTE — PLAN OF CARE
Problem: Patient Care Overview  Goal: Plan of Care Review  Outcome: Ongoing (interventions implemented as appropriate)  Flowsheets  Taken 7/9/2020 2768  Progress: no change  Outcome Summary: Pt has been in pain on and off throughout the shift, medicated with PO and IV with some relief. Pt had lumbar epidural and returned to the unit 1600. Pt states that she feels the same as before the lumbar epidural. Will continue to monitor.  Taken 7/9/2020 2048  Plan of Care Reviewed With: patient

## 2020-07-09 NOTE — NURSING NOTE
Transport here to get patient and return to her room 477.  Report called an given to Sahara Dominique RN.

## 2020-07-09 NOTE — ANESTHESIA PROCEDURE NOTES
PAIN Epidural block    Pre-sedation assessment completed: 7/9/2020 2:32 PM    Patient reassessed immediately prior to procedure    Patient location during procedure: pain clinic  Start Time: 7/9/2020 2:32 PM  Stop Time: 7/9/2020 2:45 PM  Indication:at surgeon's request and procedure for pain  Performed By  Anesthesiologist: Bladimir Dumont MD  Preanesthetic Checklist  Completed: patient identified, site marked, surgical consent, pre-op evaluation, timeout performed, IV checked, risks and benefits discussed and monitors and equipment checked  Additional Notes  Dx:  Post-Op Diagnosis Codes:     * Lumbar degenerative disc disease (M51.36)     * Lumbar disc displacement without myelopathy (M51.26)  80 mg depomedrol in epid    Plan : return to clinic as needed  Prep:  Pt Position:prone (prone)  Sterile Tech:cap, gloves, mask and sterile barrier  Prep:chlorhexidine gluconate and isopropyl alcohol  Monitoring:blood pressure monitoring, EKG and continuous pulse oximetry  Procedure:Sedation: yes     Approach:midline  Guidance: fluoroscopy and c arm pa and lat and loss of resistance  Location:lumbar  Level:3-4 (interlaminar)  Needle Type:Cody  Needle Gauge:20  Aspiration:negative  Medications:  Depomedrol:80 mg  Preservative Free Saline:3mL    Post Assessment:  Post-procedure: bandaid.  Pt Tolerance:patient tolerated the procedure well with no apparent complications  Complications:no

## 2020-07-09 NOTE — PROGRESS NOTES
I reviewed the patient's MRI of the lumbar spine.  Patient has a tiny extruded disc herniation at L3-L4 which may be the source of her pain.  She has a degenerative disc without herniation at L4-L5.  The surgical site at L5-S1 looks stable.  I do not recommend any surgical intervention.  The patient would benefit from a epidural steroid injection.  Hopefully she can receive this for pain relief before discharge.

## 2020-07-09 NOTE — DISCHARGE SUMMARY
[facility] HOSPITALIST   DISCHARGE SUMMARY      Name:  Pretty Israel   Age:  35 y.o.  Sex:  female  :  1985  MRN:  [id number]   Visit Number:  [id number]  Primary Care Physician:  Jonh Thornton MD  Date of Discharge:  2020  Admission Date:  2020      Discharge Diagnosis:     1.  Intractable low back pain with a history of low back surgery.  Status post epidural injections  2.  Obesity  3.  Mild anemia with no evidence of active bleeding.  Active Hospital Problems    Diagnosis  POA   • **Intractable low back pain [M54.5]  Yes   • Class 2 obesity in adult [E66.9]  Yes   • Anemia, chronic disease [D63.8]  Yes   • Hyperglycemia [R73.9]  Yes      Resolved Hospital Problems   No resolved problems to display.         Presenting Problem/History of Present Illness:    Lumbar radiculopathy [M54.16]  Intractable low back pain [M54.5]         Hospital Course:    Patient was admitted on 2020 with a history of obesity, lumbar disc disease status post lumbar L5-S1 fusion, depression/anxiety, status post breast augmentation who would come in with intractable pain starting on  morning.  She had previous back injury in 2017 with lumbar fusion and had been doing well, actually working and for the most part pain-free.  She denies any inciting injury, but woke up  morning with acute low back pain, and took gabapentin, Percocet, tizanidine and placed a back brace without relief.  She has no loss of bowel or bladder function.     She currently denies any chest pressure, shortness of breath, nausea or vomiting.  She was seen by orthopedic surgery Dr. Johns who had recommended MRI.  MRI showed continued anterior translation of L5 on S1 by 8 mm.  This showed otherwise improvement at this level status post surgery.  There was a disc bulge with an annular fissure at L3-L4.  Await orthopedic surgery recommendations.    Pain management Dr Dumont was consulted and performed epidural  injection at the level of L3-L4.  He recommended follow-up in 1-2 weeks for another injection.    Patient today denies any chest pressure, shortness of breath, nausea, vomiting. Patient is moving around the room.  She denies any loss of bowel or bladder function.  Her pain is mildly improved, and she is able to move about, so we will discharge her home.  She claims she has adequate muscle relaxers and pain medications at home.  Follow-up with pain management in 1-2 weeks.  Call her office if have any problem with worsening pain.    See plan for patient by pain management.  Procedures Performed:           Consults:     Consults     Date and Time Order Name Status Description    7/7/2020 0314 Inpatient Orthopedic Surgery Consult      7/7/2020 0258 [facility] (on-call MD unless specified) Details Completed           Pertinent Test Results:     Lab Results (all)     Procedure Component Value Units Date/Time    Hemoglobin A1c [[id number]]  (Normal) Collected:  07/07/20 0323    Specimen:  Blood Updated:  07/07/20 1413     Hemoglobin A1C 5.46 %     Narrative:       Hemoglobin A1C Ranges:    Increased Risk for Diabetes  5.7% to 6.4%  Diabetes                     >= 6.5%  Diabetic Goal                < 7.0%    Basic Metabolic Panel [[id number]]  (Abnormal) Collected:  07/07/20 0323    Specimen:  Blood Updated:  07/07/20 0404     Glucose 113 mg/dL      BUN 9 mg/dL      Creatinine 0.66 mg/dL      Sodium 139 mmol/L      Potassium 4.1 mmol/L      Chloride 104 mmol/L      CO2 26.1 mmol/L      Calcium 9.3 mg/dL      eGFR Non African Amer 101 mL/min/1.73      BUN/Creatinine Ratio 13.6     Anion Gap 8.9 mmol/L     Narrative:       GFR Normal >60  Chronic Kidney Disease <60  Kidney Failure <15      Comprehensive Metabolic Panel [[id number]]  (Abnormal) Collected:  07/07/20 0323    Specimen:  Blood Updated:  07/07/20 0355     Glucose 113 mg/dL      BUN 9 mg/dL      Creatinine 0.66 mg/dL      Sodium 139 mmol/L      Potassium 4.1 mmol/L       Chloride 104 mmol/L      CO2 26.1 mmol/L      Calcium 9.3 mg/dL      Total Protein 6.5 g/dL      Albumin 4.40 g/dL      ALT (SGPT) 12 U/L      AST (SGOT) 12 U/L      Alkaline Phosphatase 42 U/L      Total Bilirubin 0.3 mg/dL      eGFR Non African Amer 101 mL/min/1.73      Globulin 2.1 gm/dL      A/G Ratio 2.1 g/dL      BUN/Creatinine Ratio 13.6     Anion Gap 8.9 mmol/L     Narrative:       GFR Normal >60  Chronic Kidney Disease <60  Kidney Failure <15      CBC & Differential [[id number]] Collected:  07/07/20 0323    Specimen:  Blood Updated:  07/07/20 0336    Narrative:       The following orders were created for panel order CBC & Differential.  Procedure                               Abnormality         Status                     ---------                               -----------         ------                     CBC Auto Differential[[id number]]        Abnormal            Final result                 Please view results for these tests on the individual orders.    CBC Auto Differential [[id number]]  (Abnormal) Collected:  07/07/20 0323    Specimen:  Blood Updated:  07/07/20 0336     WBC 5.96 10*3/mm3      RBC 4.02 10*6/mm3      Hemoglobin 11.1 g/dL      Hematocrit 34.0 %      MCV 84.6 fL      MCH 27.6 pg      MCHC 32.6 g/dL      RDW 13.0 %      RDW-SD 41.0 fl      MPV 10.3 fL      Platelets 260 10*3/mm3      Neutrophil % 69.0 %      Lymphocyte % 24.5 %      Monocyte % 5.2 %      Eosinophil % 0.5 %      Basophil % 0.5 %      Immature Grans % 0.3 %      Neutrophils, Absolute 4.11 10*3/mm3      Lymphocytes, Absolute 1.46 10*3/mm3      Monocytes, Absolute 0.31 10*3/mm3      Eosinophils, Absolute 0.03 10*3/mm3      Basophils, Absolute 0.03 10*3/mm3      Immature Grans, Absolute 0.02 10*3/mm3      nRBC 0.0 /100 WBC           Imaging Results (All)     Procedure Component Value Units Date/Time    FL Guided Pain Management Spine [[id number]] Resulted:  07/09/20 1451     Updated:  07/09/20 1451    Narrative:       This  procedure was auto-finalized with no dictation required.    MRI Lumbar Spine With & Without Contrast [[id number]] Collected:  07/07/20 1456     Updated:  07/08/20 0907    Narrative:       MRI OF THE LUMBAR SPINE WITH AND WITHOUT CONTRAST     CLINICAL HISTORY: Low back pain and bilateral leg pain and weakness for  the last 3 days. Previous surgery to the lumbar spine.     MRI of the lumbar spine was obtained with sagittal pregadolinium and  postgadolinium T1, proton-density, T2, and STIR images. Additionally,  there are axial pregadolinium and postgadolinium T1 and T2-weighted  images through the lumbar spine.     Comparison is made to previous MRI of the lumbar spine dated 08/31/2017.     FINDINGS:     The conus medullaris terminates at the level of the L1-2 interspace and  has normal signal intensity. The visualized distal thoracic spinal cord  is also unremarkable in appearance.     At T10-11, T11-12, T12-L1, L1-2, and L2-3, there is no significant canal  or foraminal narrowing.     At L3-4, there is a disc bulge with a posterior annular fissure. This  results in a minimal degree of canal and foraminal narrowing. The  findings are stable when compared to the prior exam.     At L4-5, mild facet hypertrophic changes are noted. However, no  significant canal or foraminal narrowing is identified.     At the L5 level, there were bilateral pars defects identified on  previous study. There was anterior spondylolisthesis of L5 on S1 by  approximately 8 mm. There continues to be anterior translation of L5 on  S1 by a similar amount. However, in the interim since the prior study,  the patient has undergone a fusion procedure via bilateral pedicle  screws and posterior bridging rods fusing L5 down to S1 in addition to  an interbody fusion procedure at the level of the L5-S1 disc space.  Complete osseous fusion is seen across the L5-S1 disc space on the  recent plain films of 07/06/2020. On the previous MRI study, there  was  moderate to severe bilateral foraminal narrowing at the L5-S1 level and  this is improved. A mild to moderate degree of bilateral, right greater  than left, foraminal narrowing is identified at the L5-S1 level on the  current exam. This foraminal narrowing is likely in part secondary to  new bone formation resulting in some narrowing of the inferior aspect of  the L5-S1 neural foramina.       Impression:          On the prior study, there were bilateral pars defects at the L5 level  which resulted in an anterior spondylolisthesis of L5 on S1 by  approximately 8 mm. There continues to be anterior translation of L5 on  S1 by a similar amount. However, in the interim since the prior study,  the patient has undergone a fusion procedure via bilateral pedicle  screws and posterior bridging rods in addition to a fusion procedure  across the L5-S1 disc space. Complete osseous fusion is seen across this  disc space on the recent plain films of 07/06/2020. On the previous MRI  study, there was moderate to severe bilateral foraminal narrowing at the  L5-S1 level and this is improved to a mild to moderate degree of  bilateral foraminal narrowing on the current exam.     At the L3-4 level, there is a disc bulge with an annular fissure.  Minimal canal and foraminal narrowing is identified at the L3-4 level  and the findings are stable when compared to the prior study.     This report was finalized on 7/8/2020 9:04 AM by Dr. Nj Avila M.D.       XR Spine Lumbar Complete 4+VW [[id number]] Collected:  07/07/20 0003     Updated:  07/07/20 0011    Narrative:       LUMBAR SPINE SERIES     CLINICAL HISTORY: Back pain     Compared to the previous lumbar spine MRI dated 08/31/2017.     Pedicle rods and screws are in place bilaterally at the L5 and S1  levels. Markers for graft material are also evident within the L5-S1  disc space which appears mildly narrowed. There is anterolisthesis of L5  with respect to S1 by approximately  "1.5 cm it appears slightly more  prominent than on the previous MRI study. Bilateral spondylolysis is  also noted at this level. The remainder the lumbar disc spaces are  unremarkable. There is no evidence of recent or old fracture     This report was finalized on 7/7/2020 12:08 AM by Dr. Antoine Tom M.D.             Condition on Discharge:      Stable    Vital Signs:    /86 (BP Location: Left arm, Patient Position: Lying)   Pulse 81   Temp 97.8 °F (36.6 °C) (Oral)   Resp 14   Ht 167.6 cm (66\")   Wt 100 kg (220 lb 9.6 oz)   LMP 07/01/2020 (Approximate)   SpO2 99%   BMI 35.61 kg/m²     Physical Exam:    General: Alert and oriented x4, obese, moderate distress with movement  Heart: Regular rate and rhythm without murmur rub or thrill  Lungs: Clear to auscultation bilaterally without use of accessory muscles or respiration  Abdomen: Soft/nontender/nondistended.  No paraspinal megaly is noted.  MSK: Pain with movement of the lower extremities    Discharge Disposition:    Home or Self Care    Discharge Medication:       Discharge Medications      Continue These Medications      Instructions Start Date   gabapentin 300 MG capsule  Commonly known as:  NEURONTIN   300 mg, Oral, 3 Times Daily      ibuprofen 200 MG tablet  Commonly known as:  ADVIL,MOTRIN   800 mg, Oral, Every 6 Hours PRN, WILL HOLD FOR SURGERY.      oxyCODONE-acetaminophen 7.5-325 MG per tablet  Commonly known as:  PERCOCET   1 tablet, Oral, 2 Times Daily PRN      tiZANidine 4 MG tablet  Commonly known as:  ZANAFLEX   4 mg, Oral, Nightly PRN             Discharge Diet:     Diet Instructions     Diet: Regular      Discharge Diet:  Regular          Activity at Discharge:     Activity Instructions     Activity as Tolerated      Work Restrictions      Type of Restriction:  Work    May Return to Work:  Specific Date    Return To Work Date:  7/13/2020    With / Without Restrictions:  With Restrictions    Explain Work Restrictions:  No heavy " lifting.          Follow-up Appointments:    No future appointments.  Additional Instructions for the Follow-ups that You Need to Schedule     Discharge Follow-up with Specified Provider: Dr. Dumont from pain management in 1 to 2 weeks.   As directed      To:  Dr. Dumont from pain management in 1 to 2 weeks.               Test Results Pending at Discharge:           Edison Michaud DO  07/09/20  17:48

## 2020-07-10 NOTE — PROGRESS NOTES
Case Management Discharge Note      Final Note: Discharged to home on 7/9/2020. EMILY Moore RN CCP         Destination      No service has been selected for the patient.      Durable Medical Equipment      No service has been selected for the patient.      Dialysis/Infusion      No service has been selected for the patient.      Home Medical Care      No service has been selected for the patient.      Therapy      No service has been selected for the patient.      Community Resources      No service has been selected for the patient.        Transportation Services  Private: Car    Final Discharge Disposition Code: 01 - home or self-care

## 2025-05-08 ENCOUNTER — APPOINTMENT (OUTPATIENT)
Dept: GENERAL RADIOLOGY | Facility: HOSPITAL | Age: 40
End: 2025-05-08
Payer: MEDICAID

## 2025-05-08 ENCOUNTER — HOSPITAL ENCOUNTER (EMERGENCY)
Facility: HOSPITAL | Age: 40
Discharge: HOME | End: 2025-05-08
Attending: EMERGENCY MEDICINE
Payer: MEDICAID

## 2025-05-08 VITALS
BODY MASS INDEX: 34.37 KG/M2 | RESPIRATION RATE: 16 BRPM | HEART RATE: 85 BPM | OXYGEN SATURATION: 97 % | DIASTOLIC BLOOD PRESSURE: 88 MMHG | SYSTOLIC BLOOD PRESSURE: 113 MMHG | WEIGHT: 213.85 LBS | HEIGHT: 66 IN | TEMPERATURE: 98.4 F

## 2025-05-08 DIAGNOSIS — I10 HYPERTENSION, UNSPECIFIED TYPE: Primary | ICD-10-CM

## 2025-05-08 LAB
ALBUMIN SERPL-MCNC: 4.5 G/DL (ref 3.5–5.2)
ALBUMIN/GLOB SERPL: 1.5 G/DL
ALP SERPL-CCNC: 45 U/L (ref 39–117)
ALT SERPL W P-5'-P-CCNC: 30 U/L (ref 1–33)
ANION GAP SERPL CALCULATED.3IONS-SCNC: 10.8 MMOL/L (ref 5–15)
AST SERPL-CCNC: 21 U/L (ref 1–32)
BASOPHILS # BLD AUTO: 0.05 10*3/MM3 (ref 0–0.2)
BASOPHILS NFR BLD AUTO: 1.4 % (ref 0–1.5)
BILIRUB SERPL-MCNC: 0.2 MG/DL (ref 0–1.2)
BUN SERPL-MCNC: 7 MG/DL (ref 6–20)
BUN/CREAT SERPL: 8.3 (ref 7–25)
CALCIUM SPEC-SCNC: 9.3 MG/DL (ref 8.6–10.5)
CHLORIDE SERPL-SCNC: 103 MMOL/L (ref 98–107)
CO2 SERPL-SCNC: 24.2 MMOL/L (ref 22–29)
CREAT SERPL-MCNC: 0.84 MG/DL (ref 0.57–1)
DEPRECATED RDW RBC AUTO: 39.8 FL (ref 37–54)
EGFRCR SERPLBLD CKD-EPI 2021: 89.1 ML/MIN/1.73
EOSINOPHIL # BLD AUTO: 0.09 10*3/MM3 (ref 0–0.4)
EOSINOPHIL NFR BLD AUTO: 2.6 % (ref 0.3–6.2)
ERYTHROCYTE [DISTWIDTH] IN BLOOD BY AUTOMATED COUNT: 13.6 % (ref 12.3–15.4)
GEN 5 1HR TROPONIN T REFLEX: <6 NG/L
GLOBULIN UR ELPH-MCNC: 3 GM/DL
GLUCOSE SERPL-MCNC: 149 MG/DL (ref 65–99)
HCT VFR BLD AUTO: 36.1 % (ref 34–46.6)
HGB BLD-MCNC: 11.3 G/DL (ref 12–15.9)
HOLD SPECIMEN: NORMAL
HOLD SPECIMEN: NORMAL
IMM GRANULOCYTES # BLD AUTO: 0.01 10*3/MM3 (ref 0–0.05)
IMM GRANULOCYTES NFR BLD AUTO: 0.3 % (ref 0–0.5)
LIPASE SERPL-CCNC: 34 U/L (ref 13–60)
LYMPHOCYTES # BLD AUTO: 1.21 10*3/MM3 (ref 0.7–3.1)
LYMPHOCYTES NFR BLD AUTO: 35.1 % (ref 19.6–45.3)
MAGNESIUM SERPL-MCNC: 2.2 MG/DL (ref 1.6–2.6)
MCH RBC QN AUTO: 25.2 PG (ref 26.6–33)
MCHC RBC AUTO-ENTMCNC: 31.3 G/DL (ref 31.5–35.7)
MCV RBC AUTO: 80.4 FL (ref 79–97)
MONOCYTES # BLD AUTO: 0.21 10*3/MM3 (ref 0.1–0.9)
MONOCYTES NFR BLD AUTO: 6.1 % (ref 5–12)
NEUTROPHILS NFR BLD AUTO: 1.88 10*3/MM3 (ref 1.7–7)
NEUTROPHILS NFR BLD AUTO: 54.5 % (ref 42.7–76)
NRBC BLD AUTO-RTO: 0 /100 WBC (ref 0–0.2)
NT-PROBNP SERPL-MCNC: <36 PG/ML (ref 0–450)
PLATELET # BLD AUTO: 272 10*3/MM3 (ref 140–450)
PMV BLD AUTO: 9.7 FL (ref 6–12)
POTASSIUM SERPL-SCNC: 4.1 MMOL/L (ref 3.5–5.2)
PROT SERPL-MCNC: 7.5 G/DL (ref 6–8.5)
QT INTERVAL: 327 MS
QTC INTERVAL: 405 MS
RBC # BLD AUTO: 4.49 10*6/MM3 (ref 3.77–5.28)
SODIUM SERPL-SCNC: 138 MMOL/L (ref 136–145)
T4 FREE SERPL-MCNC: 1.01 NG/DL (ref 0.92–1.68)
TROPONIN T NUMERIC DELTA: NORMAL
TROPONIN T SERPL HS-MCNC: <6 NG/L
TSH SERPL DL<=0.05 MIU/L-ACNC: 0.83 UIU/ML (ref 0.27–4.2)
WBC NRBC COR # BLD AUTO: 3.45 10*3/MM3 (ref 3.4–10.8)
WHOLE BLOOD HOLD COAG: NORMAL
WHOLE BLOOD HOLD SPECIMEN: NORMAL

## 2025-05-08 PROCEDURE — 36415 COLL VENOUS BLD VENIPUNCTURE: CPT

## 2025-05-08 PROCEDURE — 71045 X-RAY EXAM CHEST 1 VIEW: CPT

## 2025-05-08 PROCEDURE — 83690 ASSAY OF LIPASE: CPT | Performed by: EMERGENCY MEDICINE

## 2025-05-08 PROCEDURE — 80053 COMPREHEN METABOLIC PANEL: CPT | Performed by: EMERGENCY MEDICINE

## 2025-05-08 PROCEDURE — 96374 THER/PROPH/DIAG INJ IV PUSH: CPT

## 2025-05-08 PROCEDURE — 84439 ASSAY OF FREE THYROXINE: CPT | Performed by: EMERGENCY MEDICINE

## 2025-05-08 PROCEDURE — 25010000002 LORAZEPAM PER 2 MG: Performed by: EMERGENCY MEDICINE

## 2025-05-08 PROCEDURE — 25810000003 SODIUM CHLORIDE 0.9 % SOLUTION: Performed by: EMERGENCY MEDICINE

## 2025-05-08 PROCEDURE — 99284 EMERGENCY DEPT VISIT MOD MDM: CPT

## 2025-05-08 PROCEDURE — 83735 ASSAY OF MAGNESIUM: CPT | Performed by: EMERGENCY MEDICINE

## 2025-05-08 PROCEDURE — 84443 ASSAY THYROID STIM HORMONE: CPT | Performed by: EMERGENCY MEDICINE

## 2025-05-08 PROCEDURE — 83880 ASSAY OF NATRIURETIC PEPTIDE: CPT | Performed by: EMERGENCY MEDICINE

## 2025-05-08 PROCEDURE — 85025 COMPLETE CBC W/AUTO DIFF WBC: CPT | Performed by: EMERGENCY MEDICINE

## 2025-05-08 PROCEDURE — 93005 ELECTROCARDIOGRAM TRACING: CPT | Performed by: EMERGENCY MEDICINE

## 2025-05-08 PROCEDURE — 84484 ASSAY OF TROPONIN QUANT: CPT | Performed by: EMERGENCY MEDICINE

## 2025-05-08 RX ORDER — SODIUM CHLORIDE 0.9 % (FLUSH) 0.9 %
10 SYRINGE (ML) INJECTION AS NEEDED
Status: DISCONTINUED | OUTPATIENT
Start: 2025-05-08 | End: 2025-05-08 | Stop reason: HOSPADM

## 2025-05-08 RX ORDER — ASPIRIN 81 MG/1
324 TABLET, CHEWABLE ORAL ONCE
Status: COMPLETED | OUTPATIENT
Start: 2025-05-08 | End: 2025-05-08

## 2025-05-08 RX ORDER — LORAZEPAM 2 MG/ML
1 INJECTION INTRAMUSCULAR ONCE
Status: COMPLETED | OUTPATIENT
Start: 2025-05-08 | End: 2025-05-08

## 2025-05-08 RX ADMIN — SODIUM CHLORIDE 500 ML: 9 INJECTION, SOLUTION INTRAVENOUS at 13:09

## 2025-05-08 RX ADMIN — LORAZEPAM 1 MG: 2 INJECTION INTRAMUSCULAR; INTRAVENOUS at 13:09

## 2025-05-08 RX ADMIN — ASPIRIN 324 MG: 81 TABLET, CHEWABLE ORAL at 11:53

## 2025-05-08 NOTE — DISCHARGE INSTRUCTIONS
Avoid stimulants.  Check your blood pressure twice daily.  Return for worsening symptoms.  Follow-up with doctor 1 to 2 days for

## 2025-05-08 NOTE — ED PROVIDER NOTES
Time: 12:26 PM EDT  Date of encounter:  5/8/2025  Independent Historian/Clinical History and Information was obtained by:   Patient    History is limited by: N/A    Chief Complaint: High blood pressure, rapid heart rate, intermittent chest discomfort      History of Present Illness:  Patient is a 40 y.o. year old female who presents to the emergency department for evaluation of high blood pressure, rapid heart rate, intermittent chest discomfort.  This patient presents to the emergency room with the above symptoms which started earlier in the day.  She has had no fever chills cough vomiting or diarrhea and currently feels improved.      Patient Care Team  Primary Care Provider: Provider, Ramya Known    Past Medical History:     No Known Allergies  Past Medical History:   Diagnosis Date    Depression with anxiety     SITUATION    Genital herpes     Heart murmur     BENIGN    Injury of back     Lumbar disc disease     Tingling     LEFT LEG FROM BACK TO FOOT     Past Surgical History:   Procedure Laterality Date    ABDOMINAL SURGERY      BENIGN TUMOR REMOVED    BREAST AUGMENTATION      LUMBAR FUSION N/A 9/15/2017    Procedure: MINIMINALLY INVASIVE SPINAL TRANSFORMINAL LUMBAR INTERBODY FUSION L5-S1;  Surgeon: Ten Johns DO;  Location: [facility];  Service:     WISDOM TOOTH EXTRACTION       Family History   Problem Relation Age of Onset    Malig Hyperthermia Neg Hx        Home Medications:  Prior to Admission medications    Medication Sig Start Date End Date Taking? Authorizing Provider   gabapentin (NEURONTIN) 300 MG capsule Take 300 mg by mouth 3 (Three) Times a Day.    Nahed Lai MD   ibuprofen (ADVIL,MOTRIN) 200 MG tablet Take 800 mg by mouth Every 6 (Six) Hours As Needed for Mild Pain . WILL HOLD FOR SURGERY.    Nahed Lai MD   oxyCODONE-acetaminophen (PERCOCET) 7.5-325 MG per tablet Take 1 tablet by mouth 2 (Two) Times a Day As Needed for Moderate Pain .    Nahed Lai MD  "  tiZANidine (ZANAFLEX) 4 MG tablet Take 4 mg by mouth At Night As Needed for Muscle Spasms.    Provider, Historical, MD        Social History:   Social History     Tobacco Use    Smoking status: Former     Current packs/day: 0.50     Types: Cigarettes    Smokeless tobacco: Never    Tobacco comments:     SOCIAL. NONE IN PAST 3 WEEKS   Substance Use Topics    Alcohol use: Yes     Comment: every other week    Drug use: No         Review of Systems:  Review of Systems   Constitutional:  Negative for chills and fever.   HENT:  Negative for congestion, ear pain and sore throat.    Eyes:  Negative for pain.   Respiratory:  Positive for shortness of breath. Negative for cough and chest tightness.    Cardiovascular:  Positive for chest pain and palpitations.   Gastrointestinal:  Negative for abdominal pain, diarrhea, nausea and vomiting.   Genitourinary:  Negative for flank pain and hematuria.   Musculoskeletal:  Negative for joint swelling.   Skin:  Negative for pallor.   Neurological:  Negative for seizures and headaches.   Psychiatric/Behavioral:  The patient is nervous/anxious.    All other systems reviewed and are negative.       Physical Exam:  /88   Pulse 85   Temp 98.4 °F (36.9 °C) (Oral)   Resp 16   Ht 167.6 cm (66\")   Wt 97 kg (213 lb 13.5 oz)   LMP 04/14/2025 (Approximate)   SpO2 97%   BMI 34.52 kg/m²     Physical Exam  Vitals and nursing note reviewed.   Constitutional:       General: She is not in acute distress.     Appearance: Normal appearance. She is not toxic-appearing.   HENT:      Head: Normocephalic and atraumatic.      Mouth/Throat:      Mouth: Mucous membranes are moist.   Eyes:      General: No scleral icterus.  Cardiovascular:      Rate and Rhythm: Regular rhythm. Tachycardia present.      Pulses: Normal pulses.      Heart sounds: Normal heart sounds.   Pulmonary:      Effort: Pulmonary effort is normal. No respiratory distress.      Breath sounds: Normal breath sounds.   Abdominal:    "   General: Abdomen is flat.      Palpations: Abdomen is soft.      Tenderness: There is no abdominal tenderness.   Musculoskeletal:         General: Normal range of motion.      Cervical back: Normal range of motion and neck supple.   Skin:     General: Skin is warm and dry.      Capillary Refill: Capillary refill takes less than 2 seconds.   Neurological:      General: No focal deficit present.      Mental Status: She is alert and oriented to person, place, and time. Mental status is at baseline.                    Medical Decision Making:      Comorbidities that affect care:    None    External Notes reviewed:    Previous Clinic Note: Office visit for dysuria      The following orders were placed and all results were independently analyzed by me:  Orders Placed This Encounter   Procedures    XR Chest 1 View    [place] Draw    High Sensitivity Troponin T    Comprehensive Metabolic Panel    Lipase    BNP    Magnesium    CBC Auto Differential    High Sensitivity Troponin T 1Hr    TSH    T4, Free    NPO Diet NPO Type: Strict NPO    Undress & Gown    Continuous Pulse Oximetry    Oxygen Therapy- Nasal Cannula; Titrate 1-6 LPM Per SpO2; 90 - 95%    ECG 12 Lead ED Triage Standing Order; Chest Pain    ECG 12 Lead ED Triage Standing Order; Chest Pain    Insert Peripheral IV    CBC & Differential    Green Top (Gel)    Lavender Top    Gold Top - SST    Light Blue Top       Medications Given in the Emergency Department:  Medications   aspirin chewable tablet 324 mg (324 mg Oral Given 5/8/25 1153)   sodium chloride 0.9 % bolus 500 mL (0 mL Intravenous Stopped 5/8/25 1339)   LORazepam (ATIVAN) injection 1 mg (1 mg Intravenous Given 5/8/25 1309)        ED Course:         EKG: Sinus rhythm with rate of 90 beats per  No acute ischemic changes were noted      Labs:    Lab Results (last 24 hours)       Procedure Component Value Units Date/Time    High Sensitivity Troponin T [[id number]]  (Normal) Collected: 05/08/25 1153    Specimen:  Blood Updated: 05/08/25 1217     HS Troponin T <6 ng/L     Narrative:      High Sensitive Troponin T Reference Range:  <14.0 ng/L- Negative Female for AMI  <22.0 ng/L- Negative Male for AMI  >=14 - Abnormal Female indicating possible myocardial injury.  >=22 - Abnormal Male indicating possible myocardial injury.   Clinicians would have to utilize clinical acumen, EKG, Troponin, and serial changes to determine if it is an Acute Myocardial Infarction or myocardial injury due to an underlying chronic condition.         CBC & Differential [[id number]]  (Abnormal) Collected: 05/08/25 1153    Specimen: Blood Updated: 05/08/25 1211    Narrative:      The following orders were created for panel order CBC & Differential.  Procedure                               Abnormality         Status                     ---------                               -----------         ------                     CBC Auto Differential[[id number]]        Abnormal            Final result                 Please view results for these tests on the individual orders.    Comprehensive Metabolic Panel [[id number]]  (Abnormal) Collected: 05/08/25 1153    Specimen: Blood Updated: 05/08/25 1220     Glucose 149 mg/dL      BUN 7 mg/dL      Creatinine 0.84 mg/dL      Sodium 138 mmol/L      Potassium 4.1 mmol/L      Chloride 103 mmol/L      CO2 24.2 mmol/L      Calcium 9.3 mg/dL      Total Protein 7.5 g/dL      Albumin 4.5 g/dL      ALT (SGPT) 30 U/L      AST (SGOT) 21 U/L      Alkaline Phosphatase 45 U/L      Total Bilirubin 0.2 mg/dL      Globulin 3.0 gm/dL      A/G Ratio 1.5 g/dL      BUN/Creatinine Ratio 8.3     Anion Gap 10.8 mmol/L      eGFR 89.1 mL/min/1.73     Narrative:      GFR Categories in Chronic Kidney Disease (CKD)              GFR Category          GFR (mL/min/1.73)    Interpretation  G1                    90 or greater        Normal or high (1)  G2                    60-89                Mild decrease (1)  G3a                   45-59                 Mild to moderate decrease  G3b                   30-44                Moderate to severe decrease  G4                    15-29                Severe decrease  G5                    14 or less           Kidney failure    (1)In the absence of evidence of kidney disease, neither GFR category G1 or G2 fulfill the criteria for CKD.    eGFR calculation 2021 CKD-EPI creatinine equation, which does not include race as a factor    Lipase [[id number]]  (Normal) Collected: 05/08/25 1153    Specimen: Blood Updated: 05/08/25 1220     Lipase 34 U/L     BNP [[id number]]  (Normal) Collected: 05/08/25 1153    Specimen: Blood Updated: 05/08/25 1217     proBNP <36.0 pg/mL     Narrative:      This assay is used as an aid in the diagnosis of individuals suspected of having heart failure. It can be used as an aid in the diagnosis of acute decompensated heart failure (ADHF) in patients presenting with signs and symptoms of ADHF to the emergency department (ED). In addition, NT-proBNP of <300 pg/mL indicates ADHF is not likely.    Age Range Result Interpretation  NT-proBNP Concentration (pg/mL:      <50             Positive            >450                   Gray                 300-450                    Negative             <300    50-75           Positive            >900                  Gray                300-900                  Negative            <300      >75             Positive            >1800                  Gray                300-1800                  Negative            <300    Magnesium [[id number]]  (Normal) Collected: 05/08/25 1153    Specimen: Blood Updated: 05/08/25 1220     Magnesium 2.2 mg/dL     CBC Auto Differential [[id number]]  (Abnormal) Collected: 05/08/25 1153    Specimen: Blood Updated: 05/08/25 1211     WBC 3.45 10*3/mm3      RBC 4.49 10*6/mm3      Hemoglobin 11.3 g/dL      Hematocrit 36.1 %      MCV 80.4 fL      MCH 25.2 pg      MCHC 31.3 g/dL      RDW 13.6 %      RDW-SD 39.8 fl      MPV 9.7 fL       Platelets 272 10*3/mm3      Neutrophil % 54.5 %      Lymphocyte % 35.1 %      Monocyte % 6.1 %      Eosinophil % 2.6 %      Basophil % 1.4 %      Immature Grans % 0.3 %      Neutrophils, Absolute 1.88 10*3/mm3      Lymphocytes, Absolute 1.21 10*3/mm3      Monocytes, Absolute 0.21 10*3/mm3      Eosinophils, Absolute 0.09 10*3/mm3      Basophils, Absolute 0.05 10*3/mm3      Immature Grans, Absolute 0.01 10*3/mm3      nRBC 0.0 /100 WBC     TSH [[id number]]  (Normal) Collected: 05/08/25 1153    Specimen: Blood Updated: 05/08/25 1256     TSH 0.832 uIU/mL     T4, Free [[id number]]  (Normal) Collected: 05/08/25 1153    Specimen: Blood Updated: 05/08/25 1256     Free T4 1.01 ng/dL     High Sensitivity Troponin T 1Hr [[id number]] Collected: 05/08/25 1445    Specimen: Blood Updated: 05/08/25 1510     HS Troponin T <6 ng/L      Troponin T Numeric Delta --     Comment: Unable to calculate.       Narrative:      High Sensitive Troponin T Reference Range:  <14.0 ng/L- Negative Female for AMI  <22.0 ng/L- Negative Male for AMI  >=14 - Abnormal Female indicating possible myocardial injury.  >=22 - Abnormal Male indicating possible myocardial injury.   Clinicians would have to utilize clinical acumen, EKG, Troponin, and serial changes to determine if it is an Acute Myocardial Infarction or myocardial injury due to an underlying chronic condition.                  Imaging:    XR Chest 1 View  Result Date: 5/8/2025  XR CHEST 1 VW Date of Exam: 5/8/2025 12:52 PM EDT Indication: Chest Pain Triage Protocol Comparison: Chest x-ray 9/13/2017 Findings: The heart is stable in size. No evidence for pneumothorax or pleural effusion. No focal infiltrate.     Impression: Stable chest without acute cardiopulmonary process. Electronically Signed: Saige Eden MD  5/8/2025 1:37 PM EDT  Workstation ID: [id number]        Differential Diagnosis and Discussion:    Palpitations: Differential diagnosis includes but is not limited to anxiety,  atrioventricular blocks, mitral valve disease, hypoxia, coronary artery disease, hypokalemia, anemia, fever, COPD, congestive heart failure, pericarditis, Jonathan-Parkinson-White syndrome, pulmonary embolism, SVT, atrial fibrillation, atrial flutter, sinus tachycardia, thyrotoxicosis, and pheochromocytoma.    PROCEDURES:    Labs were collected in the emergency department and all labs were reviewed and interpreted by me.  X-ray were performed in the emergency department and all X-ray impressions were independently interpreted by me.  An EKG was performed and the EKG was interpreted by me.    ECG 12 Lead ED Triage Standing Order; Chest Pain   Preliminary Result   HEART RATE=92  bpm   RR Mvierrql=248  ms   VT Iewxuqux=749  ms   P Horizontal Axis=1  deg   P Front Axis=55  deg   QRSD Interval=82  ms   QT Fwnpvmfu=673  ms   GGpM=554  ms   QRS Axis=16  deg   T Wave Axis=3  deg   - NORMAL ECG -   Sinus rhythm   Date and Time of Study:2025-05-08 11:59:07          Procedures    MDM     Amount and/or Complexity of Data Reviewed  Clinical lab tests: reviewed  Tests in the radiology section of CPT®: reviewed  Tests in the medicine section of CPT®: reviewed                       Patient Care Considerations:    CT CHEST: I considered ordering a CT scan of the chest, however the patient has no signs of pulmonary embolism      Consultants/Shared Management Plan:    None    Social Determinants of Health:    Patient has presented with family members who are responsible, reliable and will ensure follow up care.      Disposition and Care Coordination:    Discharged: I considered escalation of care by admitting this patient to the hospital, however the patient has a low risk heart score and she is improved after IV Ativan.  Her symptoms resolved completely    I have explained discharge medications and the need for follow up with the patient/caretakers. This was also printed in the discharge instructions. Patient was discharged with the  following medications and follow up:      Medication List      No changes were made to your prescriptions during this visit.      No follow-up provider specified.     Final diagnoses:   Hypertension, unspecified type        ED Disposition       ED Disposition   Discharge    Condition   Stable    Comment   --               This medical record created using voice recognition software.             Ugo Garcia DO  05/08/25 [id number]

## (undated) DEVICE — GLV SURG TRIUMPH CLASSIC PF LTX 7.5 STRL

## (undated) DEVICE — TOWEL,OR,DSP,ST,BLUE,STD,4/PK,20PK/CS: Brand: MEDLINE

## (undated) DEVICE — APPL DURAPREP IODOPHOR APL 26ML

## (undated) DEVICE — DRSNG BRDR MEPILEX P/OP SIL 4X8IN

## (undated) DEVICE — PK NEURO SPINE 40

## (undated) DEVICE — NDL SPINE 22G 31/2IN BLK

## (undated) DEVICE — SUT VIC 2/0 CT1 36IN

## (undated) DEVICE — DRSNG SURESITE123 8X12IN

## (undated) DEVICE — NDL TARGET BVL TP 11G 15CM

## (undated) DEVICE — CODMAN® SURGICAL PATTIES 3/4" X 3/4" (1.91CM X 1.91CM): Brand: CODMAN®

## (undated) DEVICE — ADHS SKIN DERMABOND

## (undated) DEVICE — SYR LUERLOK 30CC

## (undated) DEVICE — YANKAUER: Brand: DEROYAL

## (undated) DEVICE — DRP MICROSCP LEICA W/GLASS LENS

## (undated) DEVICE — ADHS SKIN DERMABOND TOP ADVANCED

## (undated) DEVICE — SUT VIC 1 CT1 36IN J947H

## (undated) DEVICE — COVER,MAYO STAND,STERILE: Brand: MEDLINE

## (undated) DEVICE — DISPOSABLE BIPOLAR CABLE 12FT. (3.6M): Brand: KIRWAN

## (undated) DEVICE — BIPOLAR SEALER 23-112-1 AQM 6.0: Brand: AQUAMANTYS™

## (undated) DEVICE — 1010 S-DRAPE TOWEL DRAPE 10/BX: Brand: STERI-DRAPE™

## (undated) DEVICE — TOTAL TRAY, 16FR 10ML SIL FOLEY, URN: Brand: MEDLINE

## (undated) DEVICE — 3.0MM NEURO (MATCH HEAD) LESS AGGRESSIVE

## (undated) DEVICE — GLV SURG BIOGEL LTX PF 8

## (undated) DEVICE — ILLICO BLUNT PIN: Brand: ILLICO

## (undated) DEVICE — TUBING, SUCTION, 1/4" X 20', STRAIGHT: Brand: MEDLINE INDUSTRIES, INC.

## (undated) DEVICE — CONN TBG Y 5 IN 1 LF STRL

## (undated) DEVICE — 6.0MM PRECISION ROUND

## (undated) DEVICE — ELECTRD BLD EXT EDGE 1P COAT 6.5IN STRL

## (undated) DEVICE — NDL HYPO ECLPS SFTY 22G 1 1/2IN

## (undated) DEVICE — SMOKE EVACUATION TUBING WITH 7/8 IN TO 1/4 IN REDUCER: Brand: BUFFALO FILTER